# Patient Record
Sex: MALE | Race: WHITE | Employment: OTHER | URBAN - METROPOLITAN AREA
[De-identification: names, ages, dates, MRNs, and addresses within clinical notes are randomized per-mention and may not be internally consistent; named-entity substitution may affect disease eponyms.]

---

## 2017-01-06 ENCOUNTER — TRANSFERRED RECORDS (OUTPATIENT)
Dept: CARDIOLOGY | Facility: CLINIC | Age: 82
End: 2017-01-06

## 2017-03-29 ENCOUNTER — RECORDS - HEALTHEAST (OUTPATIENT)
Dept: LAB | Facility: CLINIC | Age: 82
End: 2017-03-29

## 2017-03-29 LAB
CHOLEST SERPL-MCNC: 128 MG/DL
FASTING STATUS PATIENT QL REPORTED: YES
HDLC SERPL-MCNC: 51 MG/DL
LDLC SERPL CALC-MCNC: 64 MG/DL
TRIGL SERPL-MCNC: 66 MG/DL

## 2017-05-04 ENCOUNTER — HOSPITAL ENCOUNTER (INPATIENT)
Facility: CLINIC | Age: 82
Setting detail: SURGERY ADMIT
End: 2017-05-04
Attending: ORTHOPAEDIC SURGERY | Admitting: ORTHOPAEDIC SURGERY
Payer: MEDICARE

## 2017-05-05 ENCOUNTER — HOSPITAL ENCOUNTER (OUTPATIENT)
Dept: LAB | Facility: CLINIC | Age: 82
Discharge: HOME | End: 2017-05-05
Attending: ORTHOPAEDIC SURGERY | Admitting: ORTHOPAEDIC SURGERY
Payer: MEDICARE

## 2017-05-05 DIAGNOSIS — Z01.812 PRE-OPERATIVE LABORATORY EXAMINATION: ICD-10-CM

## 2017-05-05 LAB
MRSA DNA SPEC QL NAA+PROBE: NORMAL
SPECIMEN SOURCE: NORMAL

## 2017-05-05 PROCEDURE — 87641 MR-STAPH DNA AMP PROBE: CPT | Performed by: ORTHOPAEDIC SURGERY

## 2017-05-05 PROCEDURE — 40000829 ZZHCL STATISTIC STAPH AUREUS SUSCEPT SCREEN PCR: Performed by: ORTHOPAEDIC SURGERY

## 2017-05-05 PROCEDURE — 87640 STAPH A DNA AMP PROBE: CPT | Performed by: ORTHOPAEDIC SURGERY

## 2017-05-05 PROCEDURE — 40000830 ZZHCL STATISTIC STAPH AUREUS METH RESIST SCREEN PCR: Performed by: ORTHOPAEDIC SURGERY

## 2017-05-08 ENCOUNTER — TRANSFERRED RECORDS (OUTPATIENT)
Dept: CARDIOLOGY | Facility: CLINIC | Age: 82
End: 2017-05-08

## 2017-05-10 NOTE — PROGRESS NOTES
Pre-Surgery Review for Right Total Knee Arthroplasty on 5/16/2017:   Patient flagged with the following risk factors:  -Cardiac: History of 1)  multivessel coronary artery disease s/p placement of stents in 2000, 2009 and most recently PTCA to prox LCA, AI R PDA, AI Prox OM1 on 12/8/16. Per cardiac notes from 12/19/16 patient still complaining of chest pain s/p stent placement on 12/8/16. Stress testing completed 1/6/17, no significant changes from 2014 (see results below). On isosorbide 60mg daily for angina, on dual anti-platelet therapy with Aspirin and Plavix s/p stent placement.  2) Aortic Stenosis: reported as mild to moderate per echo 2014- may be future candidate for TAVR. 3) Bradycardia and chronotropic incompetence- question of whether pacemaker versus ablation needed in future (no further notes regarding this available). 4) PVC- reported burden of approx. 11% before Sotalol was discontinued d/t chronotropic incompetence.  5) HTN. Follows with Dr. Paz at [facility], full notes attached to chart. Per Dr. Paz- ok for patient to proceed with knee surgery- he should hold his Plavix 5 days prior to surgery but should continue Aspirin 81 mg through surgery. He should restart Plavix as soon as it is safe after surgery. Ok to proceed to surgery per PCP and Cardiology.  NM Cardiac MPI Stress Test 1/6/17:   Final Conclusions: Images demonstrated a small sized, fixed perfusion abnormality of mild intensity in the basal inferolateral wall consistent with infarction. Normal left ventricular size and systolic function with LVEF of 63%. Normal left ventricular wall motion. Compared to prior study of 2014, there is no significant change in major findings.      Assessment/Plan:  Risk Factors identified above. Will have Anesthesia review notes given cardiac history. Ok to proceed to elective surgery if approved by Anesthesia. Recommend Hospitalist consult to assist with medical management. Per  Cardiology recommendations- Orthopedic surgeon should resume Plavix when safe from a surgical perspective and patient should continue Aspirin 81 mg through surgery due to recent stent placement. Left message at Dr. Seaman s office on 5/10/17 notifying of these cardiology recommendations. Recommend close monitoring of cardiovascular status in juan-op period given history above.

## 2017-05-12 ENCOUNTER — TELEPHONE (OUTPATIENT)
Dept: CARDIOLOGY | Facility: CLINIC | Age: 82
End: 2017-05-12

## 2017-05-12 RX ORDER — CEFAZOLIN SODIUM 1 G/3ML
1 INJECTION, POWDER, FOR SOLUTION INTRAMUSCULAR; INTRAVENOUS SEE ADMIN INSTRUCTIONS
Status: CANCELLED | OUTPATIENT
Start: 2017-05-12

## 2017-05-12 RX ORDER — CEFAZOLIN SODIUM 2 G/100ML
2 INJECTION, SOLUTION INTRAVENOUS
Status: CANCELLED | OUTPATIENT
Start: 2017-05-12

## 2017-05-12 NOTE — TELEPHONE ENCOUNTER
Call made to patient in preparation for visit scheduled with Dr. Vicente 5/12/17 for cardiac clearance for scheduled knee replacement surgery. Patient stated that he is established with a Cardiologist, Dr. Paz, with [facility]. Patient stated that he would prefer to go through the process of obtaining cardiac clearance for surgery through Dr. Paz and his office at this time rather that involve another doctor. He requested to cancel the appointment Dr. Vicente. He stated that he has already contacted Dr. Paz's office to discuss having cardiac clearance completed. He did express interest in transferring care and establishing with a Cardiologist with [facility] so that he could go to his appointments in [place], which would be closer to home for him. Patient provided with telephone number for our scheduling department if he would like to schedule an appointment to do this down the road. He did not wish to set this up at this time. BEV Puentes RN

## 2017-05-14 NOTE — PHARMACY-ADMISSION MEDICATION HISTORY
Admission medication history interview status for this patient is complete. See [facility] admission navigator for allergy information, prior to admission medications and immunization status.     PTA list completed by pre-admitting nurse,   Sara Mata RN u May 4, 2017  3:25 PM       Prior to Admission medications    Medication Sig Last Dose Taking? Auth Provider   ASPIRIN PO Take 81 mg by mouth daily  Yes Reported, Patient   CLOPIDOGREL BISULFATE PO Take 75 mg by mouth daily  Yes Reported, Patient   AMLODIPINE BESYLATE PO Take 10 mg by mouth daily  Yes Reported, Patient   LISINOPRIL PO Take 20 mg by mouth daily  Yes Reported, Patient   ISOSORBIDE DINITRATE PO Take 60 mg by mouth daily   Yes Reported, Patient   PRAVASTATIN SODIUM PO Take 20 mg by mouth every evening   Yes Reported, Patient

## 2017-05-15 ENCOUNTER — DOCUMENTATION ONLY (OUTPATIENT)
Dept: CARDIOLOGY | Facility: CLINIC | Age: 82
End: 2017-05-15

## 2017-05-15 NOTE — PROGRESS NOTES
Records received [facility] Sent to HIM to be scanned Cancelled OV from for Dr. Vicente 5-

## 2017-06-15 ENCOUNTER — HOSPITAL ENCOUNTER (OUTPATIENT)
Dept: LAB | Facility: CLINIC | Age: 82
Discharge: HOME | End: 2017-06-15
Attending: ORTHOPAEDIC SURGERY | Admitting: ORTHOPAEDIC SURGERY
Payer: MEDICARE

## 2017-06-15 DIAGNOSIS — Z01.812 PRE-OPERATIVE LABORATORY EXAMINATION: ICD-10-CM

## 2017-06-15 LAB
MRSA DNA SPEC QL NAA+PROBE: NORMAL
SPECIMEN SOURCE: NORMAL

## 2017-06-15 PROCEDURE — 87641 MR-STAPH DNA AMP PROBE: CPT | Performed by: ORTHOPAEDIC SURGERY

## 2017-06-15 PROCEDURE — 40000830 ZZHCL STATISTIC STAPH AUREUS METH RESIST SCREEN PCR: Performed by: ORTHOPAEDIC SURGERY

## 2017-06-15 PROCEDURE — 87640 STAPH A DNA AMP PROBE: CPT | Performed by: ORTHOPAEDIC SURGERY

## 2017-06-15 PROCEDURE — 40000829 ZZHCL STATISTIC STAPH AUREUS SUSCEPT SCREEN PCR: Performed by: ORTHOPAEDIC SURGERY

## 2017-06-16 NOTE — PLAN OF CARE
Patient and Dr. Seaman's office notified of positive MSSA nasal screen. RX for Mupirocin called to [facility] in [place] [phone]. Mupirocin use and need for extra CHG showers discussed with patient. Questions answered.

## 2017-06-20 NOTE — PROGRESS NOTES
"CT:   D: Pre-surgery contact for a Right TKA scheduled on 6/27/2017. Pt flagging for MRAPT intervention with SNF dispo.  Pt has not had any joint surgeries in the past.    I: Spoke with patient by phone who confirms his preference for rehab at discharge.  Pt lives alone in a one story Rambler with a standard tub and shower.  Spoke with patient about the possibility of a home discharge if he progresses well. Pt states he is not opposed to going home however does not have one of those \"I've fallen and cant get up buttons and I don't plan on getting one until I'm 89\".  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 Pt has a daughter that lives near by however she works full time and cannot take time off.  Daughter would be able to help with cooking and laundry.  Pt states if his insurance allows he would be opened to HC services but still would much rather go to rehab. If his progress is such that he needs a SNF stay [facility] would be his first choice. [facility] would be his second.    A/P: Discharge planning to include consideration for a rehab facility although a home disp is not out of the question. Notified pt that SW and CC would follow for d/c planning as directed by Cathi and MD.   "

## 2017-06-22 NOTE — PROGRESS NOTES
Pre-Surgery Review for Right Total Knee Arthroplasty on 6/27/2017:   Patient flagged with the following risk factors:  -Cardiac: Follows with Dr. Paz at [facility]:   1) Multivessel coronary artery disease s/p placement of stents in 2000, 2009 and most recently with PTCA with AI to proximal circumflex to mid-OM2 and AI R PDA, on 12/8/16. Pt noted to have some improvement in angina after stent placement but continued to have some atypical episodes of chest pain and dyspnea. Stress testing completed on 1/6/17: normal LV function and small fixed defect from old infarction.  There was no ischemia. Results listed below. Repeat stress testing completed 5/24/17 in order to clear from Cardiology standpoint for surgery: Negative for myocardial ischemia, no significant change in major findings compared with prior study of 1/6/17. See full results below. On isosorbide 60mg daily for angina, nitroglycerin prn. On dual anti-platelet therapy with Aspirin and Plavix s/p AI stent placement on 12/8/16. Per Dr. Brandi mark to stop Plavix 5 days prior to surgery but patient should remain on daily 81mg Aspirin through surgery if possible. He should restart Plavix as soon as it is safe after surgery. 6/22/17: Spoke to patient and advised him to stay on daily 81mg Aspirin up until surgery per his Cardiologist recommendations. He has already stopped the Plavix as instructed.    2) Aortic Stenosis: per notes from Dr. Paz 5/17/17: degenerative, moderate stenosis, with a mean gradient of 24 mmHg, monitor with annual echo. No significant risk during surgery. Recommends follow up Echo in Dec 2017. Probable candidate for TAVR when he develops severe aortic stenosis.     3) PVC s- reported burden of approx. 11.5% on Holter monitor 10/2016: failed past treatment with Mexiletine and Sotalol. Reports occasional palpitations but not limiting.     5) HTN. Appears well-controlled on Amlodipine and Lisinopril.   Per Dr. Gwen mark for  patient to proceed with knee surgery. Follow up with him in 6 months or earlier if necessary.    NM Cardiac MPI Stress Test 1/6/17:   Final Conclusions: Images demonstrated a small sized, fixed perfusion abnormality of mild intensity in the basal inferolateral wall consistent with infarction. Normal left ventricular size and systolic function with LVEF of 63%. Normal left ventricular wall motion. Compared to prior study of 2014, there is no significant change in major findings.   Lexiscan Stress Test 5/24/17:   Final Conclusions: The stress test perfusion images show a small area of fixed mildly reduced uptake in mid to basal inferolateral wall. This is secondary to an area of prior myocardial infarction in this region. No significant ischemia was suggested by this study. Normal left ventricular size and systolic function with a calculated LVEF of 69%. Stress ECG is negative for myocardial ischemia. Compared to prior study of 1-6-17, there is no significant change in major findings.      Assessment/Plan:  Risk Factors identified above. Cleared by PCP, Anesthesia and Cardiologist for knee surgery. Recommend Hospitalist consult to assist with medical management. Per Cardiology recommendations- Orthopedic surgeon should resume Plavix when safe from a surgical perspective and patient should continue Aspirin 81mg daily through surgery due to recent stent placement on 12/8/16. Left message at Dr. Seaman s office on 6/22/17 notifying them of these cardiology recommendations. Spoke to patient on 6/22/17 and advised him of his Cardiologist recommendation to remain on daily 81mg Aspirin up until surgery but to stop Plavix 5 days prior to surgery. Would recommend Orthopedic surgeon consider keeping patient on Aspirin and Plavix for DVT Prophylaxis after surgery given patient needs to be on these for his CAD S/P stent placement 12/8/16. Recommend close monitoring of cardiovascular status in juan-op period given history above.

## 2017-06-24 NOTE — PHARMACY-ADMISSION MEDICATION HISTORY
Med rec completed by pre admitting RN    Prior to Admission medications    Medication Sig Last Dose Taking? Auth Provider   mupirocin (BACTROBAN) 2 % nasal ointment Apply into each nare 2 times daily  Yes Reported, Patient   Clopidogrel Bisulfate (PLAVIX PO) Take 75 mg by mouth daily  Yes Reported, Patient   AMLODIPINE BESYLATE PO Take 10 mg by mouth daily  Yes Reported, Patient   ASPIRIN PO Take 81 mg by mouth daily  Yes Reported, Patient   LISINOPRIL PO Take 20 mg by mouth daily  Yes Reported, Patient   ISOSORBIDE DINITRATE PO Take 60 mg by mouth daily   Yes Reported, Patient   PRAVASTATIN SODIUM PO Take 20 mg by mouth every evening   Yes Reported, Patient

## 2017-06-26 ENCOUNTER — HOSPITAL ENCOUNTER (OUTPATIENT)
Dept: LAB | Facility: CLINIC | Age: 82
Discharge: HOME | DRG: 470 | End: 2017-06-26
Attending: ORTHOPAEDIC SURGERY | Admitting: ORTHOPAEDIC SURGERY
Payer: MEDICARE

## 2017-06-26 DIAGNOSIS — Z01.812 PRE-OPERATIVE LABORATORY EXAMINATION: ICD-10-CM

## 2017-06-26 LAB
ABO + RH BLD: NORMAL
ABO + RH BLD: NORMAL
BLD GP AB SCN SERPL QL: NORMAL
BLOOD BANK CMNT PATIENT-IMP: NORMAL
CREAT SERPL-MCNC: 1.36 MG/DL (ref 0.66–1.25)
GFR SERPL CREATININE-BSD FRML MDRD: 50 ML/MIN/1.7M2
POTASSIUM SERPL-SCNC: 4 MMOL/L (ref 3.4–5.3)
SPECIMEN EXP DATE BLD: NORMAL

## 2017-06-27 ENCOUNTER — ANESTHESIA EVENT (OUTPATIENT)
Dept: SURGERY | Facility: CLINIC | Age: 82
DRG: 470 | End: 2017-06-27
Payer: MEDICARE

## 2017-06-27 ENCOUNTER — HOSPITAL ENCOUNTER (INPATIENT)
Facility: CLINIC | Age: 82
LOS: 3 days | Discharge: NURSING, REHAB OR LONG-TERM CARE | DRG: 470 | End: 2017-06-30
Attending: ORTHOPAEDIC SURGERY | Admitting: ORTHOPAEDIC SURGERY
Payer: MEDICARE

## 2017-06-27 ENCOUNTER — APPOINTMENT (OUTPATIENT)
Dept: GENERAL RADIOLOGY | Facility: CLINIC | Age: 82
DRG: 470 | End: 2017-06-27
Attending: ORTHOPAEDIC SURGERY
Payer: MEDICARE

## 2017-06-27 ENCOUNTER — ANESTHESIA (OUTPATIENT)
Dept: SURGERY | Facility: CLINIC | Age: 82
DRG: 470 | End: 2017-06-27
Payer: MEDICARE

## 2017-06-27 DIAGNOSIS — Z96.651 STATUS POST TOTAL RIGHT KNEE REPLACEMENT: Primary | ICD-10-CM

## 2017-06-27 PROBLEM — M17.9 DEGENERATIVE ARTHRITIS OF KNEE: Status: ACTIVE | Noted: 2017-06-27

## 2017-06-27 LAB — INR PPP: 1.06 (ref 0.86–1.14)

## 2017-06-27 PROCEDURE — A9270 NON-COVERED ITEM OR SERVICE: HCPCS | Mod: GY | Performed by: ORTHOPAEDIC SURGERY

## 2017-06-27 PROCEDURE — 37000008 ZZH ANESTHESIA TECHNICAL FEE, 1ST 30 MIN: Performed by: ORTHOPAEDIC SURGERY

## 2017-06-27 PROCEDURE — 25800025 ZZH RX 258: Performed by: ORTHOPAEDIC SURGERY

## 2017-06-27 PROCEDURE — 0SRC0J9 REPLACEMENT OF RIGHT KNEE JOINT WITH SYNTHETIC SUBSTITUTE, CEMENTED, OPEN APPROACH: ICD-10-PCS | Performed by: ORTHOPAEDIC SURGERY

## 2017-06-27 PROCEDURE — 25000128 H RX IP 250 OP 636: Performed by: ORTHOPAEDIC SURGERY

## 2017-06-27 PROCEDURE — 99207 ZZC CONSULT E&M CHANGED TO SUBSEQUENT LEVEL: CPT | Performed by: INTERNAL MEDICINE

## 2017-06-27 PROCEDURE — A9270 NON-COVERED ITEM OR SERVICE: HCPCS | Mod: GY | Performed by: INTERNAL MEDICINE

## 2017-06-27 PROCEDURE — 71000013 ZZH RECOVERY PHASE 1 LEVEL 1 EA ADDTL HR: Performed by: ORTHOPAEDIC SURGERY

## 2017-06-27 PROCEDURE — 71000012 ZZH RECOVERY PHASE 1 LEVEL 1 FIRST HR: Performed by: ORTHOPAEDIC SURGERY

## 2017-06-27 PROCEDURE — 25000128 H RX IP 250 OP 636: Performed by: ANESTHESIOLOGY

## 2017-06-27 PROCEDURE — 27210794 ZZH OR GENERAL SUPPLY STERILE: Performed by: ORTHOPAEDIC SURGERY

## 2017-06-27 PROCEDURE — 25000128 H RX IP 250 OP 636: Performed by: NURSE ANESTHETIST, CERTIFIED REGISTERED

## 2017-06-27 PROCEDURE — 36000063 ZZH SURGERY LEVEL 4 EA 15 ADDTL MIN: Performed by: ORTHOPAEDIC SURGERY

## 2017-06-27 PROCEDURE — 99232 SBSQ HOSP IP/OBS MODERATE 35: CPT | Performed by: INTERNAL MEDICINE

## 2017-06-27 PROCEDURE — C1776 JOINT DEVICE (IMPLANTABLE): HCPCS | Performed by: ORTHOPAEDIC SURGERY

## 2017-06-27 PROCEDURE — 25000132 ZZH RX MED GY IP 250 OP 250 PS 637: Mod: GY | Performed by: INTERNAL MEDICINE

## 2017-06-27 PROCEDURE — 40000986 XR KNEE PORT RT 1/2 VW: Mod: RT

## 2017-06-27 PROCEDURE — 36000093 ZZH SURGERY LEVEL 4 1ST 30 MIN: Performed by: ORTHOPAEDIC SURGERY

## 2017-06-27 PROCEDURE — 25000125 ZZHC RX 250: Performed by: ORTHOPAEDIC SURGERY

## 2017-06-27 PROCEDURE — 37000009 ZZH ANESTHESIA TECHNICAL FEE, EACH ADDTL 15 MIN: Performed by: ORTHOPAEDIC SURGERY

## 2017-06-27 PROCEDURE — 25000125 ZZHC RX 250: Performed by: NURSE ANESTHETIST, CERTIFIED REGISTERED

## 2017-06-27 PROCEDURE — 85610 PROTHROMBIN TIME: CPT | Performed by: ORTHOPAEDIC SURGERY

## 2017-06-27 PROCEDURE — 36415 COLL VENOUS BLD VENIPUNCTURE: CPT | Performed by: ORTHOPAEDIC SURGERY

## 2017-06-27 PROCEDURE — 93010 ELECTROCARDIOGRAM REPORT: CPT | Performed by: INTERNAL MEDICINE

## 2017-06-27 PROCEDURE — 12000007 ZZH R&B INTERMEDIATE

## 2017-06-27 PROCEDURE — 27810169 ZZH OR IMPLANT GENERAL: Performed by: ORTHOPAEDIC SURGERY

## 2017-06-27 PROCEDURE — 40000171 ZZH STATISTIC PRE-PROCEDURE ASSESSMENT III: Performed by: ORTHOPAEDIC SURGERY

## 2017-06-27 PROCEDURE — 25000132 ZZH RX MED GY IP 250 OP 250 PS 637: Mod: GY | Performed by: ORTHOPAEDIC SURGERY

## 2017-06-27 DEVICE — IMP COMP PATELLA STRK TRI SYM X3 36X10MM 5550-G-360: Type: IMPLANTABLE DEVICE | Site: KNEE | Status: FUNCTIONAL

## 2017-06-27 DEVICE — BONE CEMENT SIMPLEX FULL DOSE 6191-1-001: Type: IMPLANTABLE DEVICE | Site: KNEE | Status: FUNCTIONAL

## 2017-06-27 DEVICE — IMP BASEPLATE TIBIAL HOWM TRI 6 5520-B-600: Type: IMPLANTABLE DEVICE | Site: KNEE | Status: FUNCTIONAL

## 2017-06-27 DEVICE — IMP COMP FEM STRK TRIATHLN CR RT 6 5510-F-602: Type: IMPLANTABLE DEVICE | Site: KNEE | Status: FUNCTIONAL

## 2017-06-27 DEVICE — IMPLANTABLE DEVICE: Type: IMPLANTABLE DEVICE | Site: KNEE | Status: FUNCTIONAL

## 2017-06-27 RX ORDER — ONDANSETRON 2 MG/ML
INJECTION INTRAMUSCULAR; INTRAVENOUS PRN
Status: DISCONTINUED | OUTPATIENT
Start: 2017-06-27 | End: 2017-06-27

## 2017-06-27 RX ORDER — DEXAMETHASONE SODIUM PHOSPHATE 10 MG/ML
INJECTION INTRAMUSCULAR; INTRAVENOUS PRN
Status: DISCONTINUED | OUTPATIENT
Start: 2017-06-27 | End: 2017-06-27 | Stop reason: HOSPADM

## 2017-06-27 RX ORDER — LISINOPRIL 20 MG/1
20 TABLET ORAL DAILY
Status: DISCONTINUED | OUTPATIENT
Start: 2017-06-27 | End: 2017-06-30 | Stop reason: HOSPADM

## 2017-06-27 RX ORDER — CLOPIDOGREL BISULFATE 75 MG/1
75 TABLET ORAL DAILY
Status: DISCONTINUED | OUTPATIENT
Start: 2017-06-27 | End: 2017-06-30 | Stop reason: HOSPADM

## 2017-06-27 RX ORDER — LIDOCAINE 40 MG/G
CREAM TOPICAL
Status: DISCONTINUED | OUTPATIENT
Start: 2017-06-27 | End: 2017-06-27 | Stop reason: HOSPADM

## 2017-06-27 RX ORDER — OXYCODONE HYDROCHLORIDE 5 MG/1
5 TABLET ORAL
Status: DISCONTINUED | OUTPATIENT
Start: 2017-06-27 | End: 2017-06-28

## 2017-06-27 RX ORDER — ONDANSETRON 2 MG/ML
4 INJECTION INTRAMUSCULAR; INTRAVENOUS EVERY 30 MIN PRN
Status: DISCONTINUED | OUTPATIENT
Start: 2017-06-27 | End: 2017-06-27 | Stop reason: HOSPADM

## 2017-06-27 RX ORDER — ACETAMINOPHEN 325 MG/1
650 TABLET ORAL EVERY 4 HOURS PRN
Status: DISCONTINUED | OUTPATIENT
Start: 2017-06-30 | End: 2017-06-30 | Stop reason: HOSPADM

## 2017-06-27 RX ORDER — ACETAMINOPHEN 325 MG/1
975 TABLET ORAL EVERY 8 HOURS
Status: COMPLETED | OUTPATIENT
Start: 2017-06-27 | End: 2017-06-30

## 2017-06-27 RX ORDER — PROPOFOL 10 MG/ML
INJECTION, EMULSION INTRAVENOUS CONTINUOUS PRN
Status: DISCONTINUED | OUTPATIENT
Start: 2017-06-27 | End: 2017-06-27

## 2017-06-27 RX ORDER — ISOSORBIDE DINITRATE 20 MG/1
60 TABLET ORAL DAILY
Status: DISCONTINUED | OUTPATIENT
Start: 2017-06-28 | End: 2017-06-30 | Stop reason: HOSPADM

## 2017-06-27 RX ORDER — ONDANSETRON 2 MG/ML
4 INJECTION INTRAMUSCULAR; INTRAVENOUS EVERY 6 HOURS PRN
Status: DISCONTINUED | OUTPATIENT
Start: 2017-06-27 | End: 2017-06-30 | Stop reason: HOSPADM

## 2017-06-27 RX ORDER — LIDOCAINE 40 MG/G
CREAM TOPICAL
Status: DISCONTINUED | OUTPATIENT
Start: 2017-06-27 | End: 2017-06-30 | Stop reason: HOSPADM

## 2017-06-27 RX ORDER — NALOXONE HYDROCHLORIDE 0.4 MG/ML
.1-.4 INJECTION, SOLUTION INTRAMUSCULAR; INTRAVENOUS; SUBCUTANEOUS
Status: DISCONTINUED | OUTPATIENT
Start: 2017-06-27 | End: 2017-06-30 | Stop reason: HOSPADM

## 2017-06-27 RX ORDER — HYDROXYZINE HYDROCHLORIDE 10 MG/1
10 TABLET, FILM COATED ORAL EVERY 6 HOURS PRN
Status: DISCONTINUED | OUTPATIENT
Start: 2017-06-27 | End: 2017-06-30 | Stop reason: HOSPADM

## 2017-06-27 RX ORDER — SODIUM CHLORIDE, SODIUM LACTATE, POTASSIUM CHLORIDE, CALCIUM CHLORIDE 600; 310; 30; 20 MG/100ML; MG/100ML; MG/100ML; MG/100ML
INJECTION, SOLUTION INTRAVENOUS CONTINUOUS
Status: DISCONTINUED | OUTPATIENT
Start: 2017-06-27 | End: 2017-06-27 | Stop reason: HOSPADM

## 2017-06-27 RX ORDER — METOPROLOL TARTRATE 1 MG/ML
INJECTION, SOLUTION INTRAVENOUS PRN
Status: DISCONTINUED | OUTPATIENT
Start: 2017-06-27 | End: 2017-06-27

## 2017-06-27 RX ORDER — NALOXONE HYDROCHLORIDE 0.4 MG/ML
.1-.4 INJECTION, SOLUTION INTRAMUSCULAR; INTRAVENOUS; SUBCUTANEOUS
Status: DISCONTINUED | OUTPATIENT
Start: 2017-06-27 | End: 2017-06-27 | Stop reason: HOSPADM

## 2017-06-27 RX ORDER — PRAVASTATIN SODIUM 20 MG
20 TABLET ORAL EVERY EVENING
Status: DISCONTINUED | OUTPATIENT
Start: 2017-06-27 | End: 2017-06-30 | Stop reason: HOSPADM

## 2017-06-27 RX ORDER — AMOXICILLIN 250 MG
1-2 CAPSULE ORAL 2 TIMES DAILY
Status: DISCONTINUED | OUTPATIENT
Start: 2017-06-27 | End: 2017-06-30 | Stop reason: HOSPADM

## 2017-06-27 RX ORDER — HYDROMORPHONE HCL/0.9% NACL/PF 0.2MG/0.2
0.2 SYRINGE (ML) INTRAVENOUS
Status: DISCONTINUED | OUTPATIENT
Start: 2017-06-27 | End: 2017-06-30 | Stop reason: HOSPADM

## 2017-06-27 RX ORDER — MEPERIDINE HYDROCHLORIDE 25 MG/ML
12.5 INJECTION INTRAMUSCULAR; INTRAVENOUS; SUBCUTANEOUS
Status: DISCONTINUED | OUTPATIENT
Start: 2017-06-27 | End: 2017-06-27 | Stop reason: HOSPADM

## 2017-06-27 RX ORDER — FENTANYL CITRATE 50 UG/ML
25-50 INJECTION, SOLUTION INTRAMUSCULAR; INTRAVENOUS
Status: DISCONTINUED | OUTPATIENT
Start: 2017-06-27 | End: 2017-06-27 | Stop reason: HOSPADM

## 2017-06-27 RX ORDER — ASPIRIN 81 MG/1
81 TABLET, CHEWABLE ORAL DAILY
Status: DISCONTINUED | OUTPATIENT
Start: 2017-06-28 | End: 2017-06-30 | Stop reason: HOSPADM

## 2017-06-27 RX ORDER — ONDANSETRON 4 MG/1
4 TABLET, ORALLY DISINTEGRATING ORAL EVERY 6 HOURS PRN
Status: DISCONTINUED | OUTPATIENT
Start: 2017-06-27 | End: 2017-06-30 | Stop reason: HOSPADM

## 2017-06-27 RX ORDER — ALBUTEROL SULFATE 0.83 MG/ML
2.5 SOLUTION RESPIRATORY (INHALATION) EVERY 4 HOURS PRN
Status: DISCONTINUED | OUTPATIENT
Start: 2017-06-27 | End: 2017-06-27 | Stop reason: HOSPADM

## 2017-06-27 RX ORDER — AMLODIPINE BESYLATE 10 MG/1
10 TABLET ORAL DAILY
Status: DISCONTINUED | OUTPATIENT
Start: 2017-06-28 | End: 2017-06-30 | Stop reason: HOSPADM

## 2017-06-27 RX ORDER — BUPIVACAINE HYDROCHLORIDE AND EPINEPHRINE 5; 5 MG/ML; UG/ML
INJECTION, SOLUTION EPIDURAL; INTRACAUDAL; PERINEURAL PRN
Status: DISCONTINUED | OUTPATIENT
Start: 2017-06-27 | End: 2017-06-27 | Stop reason: HOSPADM

## 2017-06-27 RX ORDER — METOPROLOL TARTRATE 1 MG/ML
1-2 INJECTION, SOLUTION INTRAVENOUS EVERY 5 MIN PRN
Status: DISCONTINUED | OUTPATIENT
Start: 2017-06-27 | End: 2017-06-27 | Stop reason: HOSPADM

## 2017-06-27 RX ORDER — CEFAZOLIN SODIUM 1 G/3ML
1 INJECTION, POWDER, FOR SOLUTION INTRAMUSCULAR; INTRAVENOUS SEE ADMIN INSTRUCTIONS
Status: DISCONTINUED | OUTPATIENT
Start: 2017-06-27 | End: 2017-06-27 | Stop reason: HOSPADM

## 2017-06-27 RX ORDER — DEXTROSE MONOHYDRATE, SODIUM CHLORIDE, AND POTASSIUM CHLORIDE 50; 1.49; 4.5 G/1000ML; G/1000ML; G/1000ML
INJECTION, SOLUTION INTRAVENOUS CONTINUOUS
Status: DISCONTINUED | OUTPATIENT
Start: 2017-06-27 | End: 2017-06-29

## 2017-06-27 RX ORDER — HYDROMORPHONE HYDROCHLORIDE 1 MG/ML
.3-.5 INJECTION, SOLUTION INTRAMUSCULAR; INTRAVENOUS; SUBCUTANEOUS EVERY 10 MIN PRN
Status: DISCONTINUED | OUTPATIENT
Start: 2017-06-27 | End: 2017-06-27 | Stop reason: HOSPADM

## 2017-06-27 RX ORDER — PROMETHAZINE HYDROCHLORIDE 25 MG/ML
6.25 INJECTION, SOLUTION INTRAMUSCULAR; INTRAVENOUS
Status: DISCONTINUED | OUTPATIENT
Start: 2017-06-27 | End: 2017-06-27 | Stop reason: HOSPADM

## 2017-06-27 RX ORDER — ONDANSETRON 4 MG/1
4 TABLET, ORALLY DISINTEGRATING ORAL EVERY 30 MIN PRN
Status: DISCONTINUED | OUTPATIENT
Start: 2017-06-27 | End: 2017-06-27 | Stop reason: HOSPADM

## 2017-06-27 RX ORDER — PROCHLORPERAZINE MALEATE 5 MG
5 TABLET ORAL EVERY 6 HOURS PRN
Status: DISCONTINUED | OUTPATIENT
Start: 2017-06-27 | End: 2017-06-30 | Stop reason: HOSPADM

## 2017-06-27 RX ORDER — CEFAZOLIN SODIUM 2 G/100ML
2 INJECTION, SOLUTION INTRAVENOUS
Status: COMPLETED | OUTPATIENT
Start: 2017-06-27 | End: 2017-06-27

## 2017-06-27 RX ADMIN — CLOPIDOGREL 75 MG: 75 TABLET, FILM COATED ORAL at 18:22

## 2017-06-27 RX ADMIN — PHENYLEPHRINE HYDROCHLORIDE 100 MCG: 10 INJECTION, SOLUTION INTRAMUSCULAR; INTRAVENOUS; SUBCUTANEOUS at 12:33

## 2017-06-27 RX ADMIN — ONDANSETRON 4 MG: 2 INJECTION INTRAMUSCULAR; INTRAVENOUS at 12:55

## 2017-06-27 RX ADMIN — CEFAZOLIN SODIUM 2 G: 2 INJECTION, SOLUTION INTRAVENOUS at 11:57

## 2017-06-27 RX ADMIN — Medication 1 G: at 13:15

## 2017-06-27 RX ADMIN — SODIUM CHLORIDE, POTASSIUM CHLORIDE, SODIUM LACTATE AND CALCIUM CHLORIDE: 600; 310; 30; 20 INJECTION, SOLUTION INTRAVENOUS at 13:00

## 2017-06-27 RX ADMIN — LISINOPRIL 20 MG: 20 TABLET ORAL at 18:23

## 2017-06-27 RX ADMIN — POTASSIUM CHLORIDE, DEXTROSE MONOHYDRATE AND SODIUM CHLORIDE: 150; 5; 450 INJECTION, SOLUTION INTRAVENOUS at 17:02

## 2017-06-27 RX ADMIN — PRAVASTATIN SODIUM 20 MG: 20 TABLET ORAL at 18:23

## 2017-06-27 RX ADMIN — GENTAMICIN SULFATE: 40 INJECTION, SOLUTION INTRAMUSCULAR; INTRAVENOUS at 12:30

## 2017-06-27 RX ADMIN — CEFAZOLIN SODIUM 1 G: 1 INJECTION, SOLUTION INTRAVENOUS at 20:24

## 2017-06-27 RX ADMIN — Medication 1 G: at 12:12

## 2017-06-27 RX ADMIN — SENNOSIDES AND DOCUSATE SODIUM 1 TABLET: 8.6; 5 TABLET ORAL at 20:24

## 2017-06-27 RX ADMIN — OXYCODONE HYDROCHLORIDE 5 MG: 5 TABLET ORAL at 16:31

## 2017-06-27 RX ADMIN — PROPOFOL 75 MCG/KG/MIN: 10 INJECTION, EMULSION INTRAVENOUS at 12:08

## 2017-06-27 RX ADMIN — HYDROXYZINE HYDROCHLORIDE 10 MG: 10 TABLET ORAL at 20:24

## 2017-06-27 RX ADMIN — SODIUM CHLORIDE, POTASSIUM CHLORIDE, SODIUM LACTATE AND CALCIUM CHLORIDE: 600; 310; 30; 20 INJECTION, SOLUTION INTRAVENOUS at 11:57

## 2017-06-27 RX ADMIN — METOPROLOL TARTRATE 1 MG: 5 INJECTION INTRAVENOUS at 12:05

## 2017-06-27 RX ADMIN — OXYCODONE HYDROCHLORIDE 5 MG: 5 TABLET ORAL at 19:30

## 2017-06-27 RX ADMIN — ACETAMINOPHEN 975 MG: 325 TABLET, FILM COATED ORAL at 16:31

## 2017-06-27 NOTE — ANESTHESIA CARE TRANSFER NOTE
Patient: Mickey Donald    Procedure(s):  Right total knee arthroplasty - Wound Class: I-Clean    Diagnosis: Right DJD  Diagnosis Additional Information: No value filed.    Anesthesia Type:   No value filed.     Note:    Patient transferred to:PACU  Comments: Pt tolerated spinal well to PACU VSS Report to RN      Vitals: (Last set prior to Anesthesia Care Transfer)    CRNA VITALS  6/27/2017 1251 - 6/27/2017 1326      6/27/2017             Pulse: 86    SpO2: 100 %                Electronically Signed By: JAVI Baron CRNA  June 27, 2017  1:26 PM

## 2017-06-27 NOTE — ANESTHESIA PROCEDURE NOTES
Peripheral nerve/Neuraxial procedure note : Femoral  Pre-Procedure  Performed by MIKY BACA  Referred by MEL SAWANT MD  Location: pre-op    Procedure Times:6/27/2017 11:34 AM and 6/27/2017 11:39 AM  Pre-Anesthestic Checklist: patient identified, IV checked, site marked, risks and benefits discussed, informed consent, monitors and equipment checked, pre-op evaluation, at physician/surgeon's request and post-op pain management    Timeout  Correct Patient: Yes   Correct Procedure: Yes   Correct Site: Yes   Correct Laterality: Yes   Correct Position: Yes   Site Marked: Yes   .   Procedure Documentation    .    Procedure:    Femoral.  Local skin infiltrated with mL of 1% lidocaine.     Ultrasound used to identify targeted nerve, plexus, or vascular marker and placed a needle adjacent to it., Ultrasound was used to visualize the spread of the anesthetic in close proximity to the above stated nerve.   Patient Prep;mask, sterile gloves, povidone-iodine 7.5% surgical scrub.  Nerve Stim: Initial Level 1 mA. Lowest motor response mA..  Needle: insulated  Needle Length (Inches) 2  Insertion Method: Single Shot.       Assessment/Narrative  Paresthesias: No.  .  The placement was negative for: blood aspirated, painful injection and site bleeding.  Bolus given via needle..   Secured via.   Complications: none. Comments:  ./The surgeon has given a verbal order transferring care of this patient to me for the performance of a regional analgesia block for post-op pain control. It is requested of me because I am uniquely trained and qualified to perform this block and the surgeon is neither trained nor qualified to perform this procedure.  .Femoral Nerve Block    Medication- Bupivicaine 0.75% 16cc + Lido 2% w/ epi 1:200k 4cc    JUAN Baca

## 2017-06-27 NOTE — CONSULTS
"[facility]  Hospitalist Consult Note    Sukhwinder Pizarro MD 06/27/2017  Text Page      Reason for Stay (Diagnosis): TKA         Assessment and Plan:      Summary of Stay: Mickey Donald is a 85 year old male admitted on 6/27/2017 after R TKA.  Hx of CAD with 3 stents, moderate AS, PVCs, HTN, dyslipidemia, inguinal hernia repair, closed head injury.     Problem List:   1. Post op R TKA-  doing well.  Pain well controlled.  Continue with PT and pain control.  2. HTN- resume lisinopril, amlodipine, imdur with parameters.   3. CAD- currently asymptomatic.  Resume ASA and plavix if OK with ortho.  4. AS- no current symptoms.  5. Dyslipidemia- resume pravastatin      DVT Prophylaxis: Warfarin  Code Status: Full Code        Interval History (Subjective):      Mild pain in R knee, otherwise feels well.                    Physical Exam:      Last Vital Signs:  /89  Pulse 94  Temp 95.2  F (35.1  C) (Oral)  Resp 16  Ht 1.753 m (5' 9\")  Wt 76.2 kg (168 lb)  SpO2 96%  BMI 24.81 kg/m2    Vital signs reviewed  General:  Alert, calm, NAD  CV: regular rate and rhythm, 3/6 LAWSON  Lungs:  Clear to ascultation bilaterally, normal respiratory effort  HEENT:  Pupil round, equal, conjuctivae, sclerae and lids normal, neck is supple  Abdomen:  Soft, nontender, nondistended, no masses, normal bowel sounds  Extremities:  No edema.  R leg wrapped.   Neuro: normal strength and sensation in all 4 extremities, cranial nerves grossly intact  Psychiatric:  Mood and affect within normal limits           Medications:      All current medications were reviewed with changes reflected in problem list.         Data:      All new lab and imaging data was reviewed.   Labs:  Cr 1.3    "

## 2017-06-27 NOTE — ANESTHESIA PREPROCEDURE EVALUATION
Anesthesia Evaluation     .             ROS/MED HX    ENT/Pulmonary:  - neg pulmonary ROS     Neurologic:  - neg neurologic ROS     Cardiovascular: Comment: Aortic stenosis  1.1 cm valve area  Gradient 28mmHg    (+) hypertension--CAD, --stent,. : . . . :. valvular problems/murmurs type: AS .       METS/Exercise Tolerance:     Hematologic:  - neg hematologic  ROS       Musculoskeletal:  - neg musculoskeletal ROS       GI/Hepatic:  - neg GI/hepatic ROS       Renal/Genitourinary:  - ROS Renal section negative       Endo: Comment: .Body mass index is 24.81 kg/(m^2).   - neg endo ROS       Psychiatric:  - neg psychiatric ROS       Infectious Disease:  - neg infectious disease ROS       Malignancy:         Other: Comment: .No lab results found.   Lab Test        06/26/17                       0715          POTASSIUM    4.0           CR           1.36*            - neg other ROS                 Physical Exam  Normal systems: cardiovascular and pulmonary    Airway   Mallampati: II    Dental     Cardiovascular   Rhythm and rate: regular and normal      Pulmonary    breath sounds clear to auscultation                    Anesthesia Plan      History & Physical Review  History and physical reviewed and following examination; no interval change.    ASA Status:  3 .        Plan for Spinal and Periph. Nerve Block for postop pain          Postoperative Care  Postoperative pain management:  IV analgesics, Oral pain medications and Multi-modal analgesia.      Consents                          .

## 2017-06-27 NOTE — IP AVS SNAPSHOT
` [facility]: [phone]            Medication Administration Report for Mickey Donald as of 06/30/17 1342   Legend:    Given Hold Not Given Due Canceled Entry Other Actions    Time Time (Time) Time  Time-Action       Inactive    Active    Linked        Medications 06/24/17 06/25/17 06/26/17 06/27/17 06/28/17 06/29/17 06/30/17    acetaminophen (TYLENOL) tablet 650 mg  Dose: 650 mg Freq: EVERY 4 HOURS PRN Route: PO  PRN Reason: other  PRN Comment: surgical pain  Start: 06/30/17 0000   Admin Instructions: May give first dose 4 hours after last scheduled dose of acetaminophen.  Maximum acetaminophen dose from all sources = 75 mg/kg/day not to exceed 4 grams/day.               amLODIPine (NORVASC) tablet 10 mg  Dose: 10 mg Freq: DAILY Route: PO  Start: 06/28/17 0800   Admin Instructions: Hold if SBP < 110         0820 (10 mg)-Given        0806 (10 mg)-Given        0820 (10 mg)-Given           aspirin chewable tablet 81 mg  Dose: 81 mg Freq: DAILY Route: PO  Start: 06/28/17 0800        0820 (81 mg)-Given        0807 (81 mg)-Given        0820 (81 mg)-Given           benzocaine-menthol (CHLORASEPTIC) 6-10 MG lozenge 1-2 lozenge  Dose: 1-2 lozenge Freq: EVERY 1 HOUR PRN Route: BU  PRN Reason: sore throat  PRN Comment: sore throat without fever  Start: 06/27/17 1531              clopidogrel (PLAVIX) tablet 75 mg  Dose: 75 mg Freq: DAILY Route: PO  Start: 06/27/17 1630   Admin Instructions: Resume if ok with ortho        1822 (75 mg)-Given        0820 (75 mg)-Given        0807 (75 mg)-Given        0820 (75 mg)-Given           HYDROmorphone (DILAUDID) injection 0.2 mg  Dose: 0.2 mg Freq: EVERY 2 HOURS PRN Route: IV  PRN Reason: severe pain  PRN Comment: or if patient unable to take PO  Start: 06/27/17 1531   Admin Instructions: Hold while on PCA.               hydrOXYzine (ATARAX) tablet 10 mg  Dose: 10 mg Freq: EVERY 6 HOURS PRN Route: PO  PRN Reason: itching  Start: 06/27/17 1531   Admin  "Instructions: Caution to be used when administering multiple CNS depressing meds within a short time frame.        2024 (10 mg)-Given              isosorbide dinitrate (ISORDIL) tablet 60 mg  Dose: 60 mg Freq: DAILY Route: PO  Start: 06/28/17 0800   Admin Instructions: Hold if SBP <110         0820 (60 mg)-Given        0806 (60 mg)-Given        0820 (60 mg)-Given           lidocaine (LMX4) kit  Freq: EVERY 1 HOUR PRN Route: Top  PRN Reason: pain  PRN Comment: with VAD insertion or accessing implanted port.  Start: 06/27/17 1531   Admin Instructions: Do NOT give if patient has a history of allergy to any local anesthetic or any \"asia\" product.   Apply 30 minutes prior to VAD insertion or port access.  MAX Dose:  2.5 g (  of 5 g tube)               lidocaine 1 % 1 mL  Dose: 1 mL Freq: EVERY 1 HOUR PRN Route: OTHER  PRN Comment: mild pain with VAD insertion or accessing implanted port  Start: 06/27/17 1531   Admin Instructions: Do NOT give if patient has a history of allergy to any local anesthetic or any \"asia\" product. MAX dose 1 mL subcutaneous OR intradermal in divided doses.               lisinopril (PRINIVIL/ZESTRIL) tablet 20 mg  Dose: 20 mg Freq: DAILY Route: PO  Start: 06/27/17 1800       1823 (20 mg)-Given        (0823)-Not Given [C]       2007 (20 mg)-Given        2018 (20 mg)-Given        [ ] 2000           naloxone (NARCAN) injection 0.1-0.4 mg  Dose: 0.1-0.4 mg Freq: EVERY 2 MIN PRN Route: IV  PRN Reason: opioid reversal  Start: 06/27/17 1531   Admin Instructions: For respiratory rate LESS than or EQUAL to 8.  Partial reversal dose:  0.1 mg titrated q 2 minutes for Analgesia Side Effects Monitoring Sedation Level of 3 (frequently drowsy, arousable, drifts to sleep during conversation).Full reversal dose:  0.4 mg bolus for Analgesia Side Effects Monitoring Sedation Level of 4 (somnolent, minimal or no response to stimulation).               ondansetron (ZOFRAN-ODT) ODT tab 4 mg  Dose: 4 mg Freq: EVERY 6 " HOURS PRN Route: PO  PRN Reasons: nausea,vomiting  Start: 06/27/17 1531   Admin Instructions: This is Step 1 of nausea and vomiting management.  If nausea not resolved in 15 minutes, go to Step 2 prochlorperazine (COMPAZINE). Do not push through foil backing. Peel back foil and gently remove. Place on tongue immediately. Administration with liquid unnecessary              Or  ondansetron (ZOFRAN) injection 4 mg  Dose: 4 mg Freq: EVERY 6 HOURS PRN Route: IV  PRN Reasons: nausea,vomiting  Start: 06/27/17 1531   Admin Instructions: This is Step 1 of nausea and vomiting management.  If nausea not resolved in 15 minutes, go to Step 2 prochlorperazine (COMPAZINE).  Irritant.               oxyCODONE (ROXICODONE) IR tablet 5-10 mg  Dose: 5-10 mg Freq: EVERY 3 HOURS PRN Route: PO  PRN Reason: moderate to severe pain  Start: 06/28/17 1421        1517 (10 mg)-Given       2012 (5 mg)-Given       2305 (5 mg)-Given        0807 (5 mg)-Given       1241 (5 mg)-Given       1848 (5 mg)-Given            pravastatin (PRAVACHOL) tablet 20 mg  Dose: 20 mg Freq: EVERY EVENING Route: PO  Start: 06/27/17 2000       1823 (20 mg)-Given               2007 (20 mg)-Given        2018 (20 mg)-Given        [ ] 2000           prochlorperazine (COMPAZINE) injection 5 mg  Dose: 5 mg Freq: EVERY 6 HOURS PRN Route: IV  PRN Reasons: nausea,vomiting  Start: 06/27/17 1531   Admin Instructions: This is Step 2 of nausea and vomiting management.   If nausea not resolved in 15 minutes, give metoclopramide (REGLAN) if ordered (step 3 of nausea and vomiting management)              Or  prochlorperazine (COMPAZINE) tablet 5 mg  Dose: 5 mg Freq: EVERY 6 HOURS PRN Route: PO  PRN Reasons: nausea,vomiting  Start: 06/27/17 1531   Admin Instructions: This is Step 2 of nausea and vomiting management.   If nausea not resolved in 15 minutes, give metoclopramide (REGLAN) if ordered (step 3 of nausea and vomiting management)               senna-docusate  (SENOKOT-S;PERICOLACE) 8.6-50 MG per tablet 1-2 tablet  Dose: 1-2 tablet Freq: 2 TIMES DAILY Route: PO  Start: 06/27/17 2000   Admin Instructions: Start with 1 tablet PO BID, If no bowel movement in 24 hours, increase to 2 tablets PO BID.  Hold for loose stools.        2024 (1 tablet)-Given        0820 (1 tablet)-Given       2007 (1 tablet)-Given        0807 (1 tablet)-Given       2018 (2 tablet)-Given        0820 (2 tablet)-Given       [ ] 2000           sodium chloride (PF) 0.9% PF flush 3 mL  Dose: 3 mL Freq: EVERY 8 HOURS Route: IK  Start: 06/27/17 1545   Admin Instructions: And Q1H PRN, to lock peripheral IV dormant line.        (1602)-Not Given       (2326)-Not Given        1600 (3 mL)-Given        0124 (3 mL)-Given       (1139)-Not Given       1548 (3 mL)-Given        [ ] 0000       (1004)-Not Given       [ ] 1600           sodium chloride (PF) 0.9% PF flush 3 mL  Dose: 3 mL Freq: EVERY 1 HOUR PRN Route: IK  PRN Reason: line flush  PRN Comment: for peripheral IV flush post IV meds  Start: 06/27/17 1531              zolpidem (AMBIEN) half-tab 2.5 mg  Dose: 2.5 mg Freq: AT BEDTIME PRN Route: PO  PRN Reason: sleep  Start: 06/28/17 2000   Admin Instructions: POD 1.  Do not give unless at least 6 hours of uninterrupted sleep is expected.              Completed Medications  Medications 06/24/17 06/25/17 06/26/17 06/27/17 06/28/17 06/29/17 06/30/17         Dose: 975 mg Freq: EVERY 8 HOURS Route: PO  Start: 06/27/17 1600   End: 06/30/17 0819   Admin Instructions: Do not use if patient has an active opioid/acetaminophen analgesic order for pain  Maximum acetaminophen dose from all sources = 75 mg/kg/day not to exceed 4 grams/day.        1631 (975 mg)-Given        0025 (975 mg)-Given       0820 (975 mg)-Given       1559 (975 mg)-Given        0115 (975 mg)-Given       0807 (975 mg)-Given       1548 (975 mg)-Given        0059 (975 mg)-Given       0819 (975 mg)-Given             Dose: 1 g Freq: EVERY 8 HOURS Route:  IV  Indications of Use: SURGICAL PROPHYLAXIS  Start: 06/27/17 2000   End: 06/28/17 0406   Admin Instructions: First post-op dose due 8 hours after intra-op dose, see eMAR.        2024 (1 g)-New Bag        0336 (1 g)-New Bag            Discontinued Medications  Medications 06/24/17 06/25/17 06/26/17 06/27/17 06/28/17 06/29/17 06/30/17         Dose: 2.5 mg Freq: EVERY 4 HOURS PRN Route: NEBULIZATION  PRN Reason: shortness of breath / dyspnea  Start: 06/27/17 1336   End: 06/27/17 1457       1457-Med Discontinued            Freq: PRN  Start: 06/27/17 1252   End: 06/27/17 1457       1252 (30 mL)-Given       1457-Med Discontinued            Freq: PRN  Start: 06/27/17 1252   End: 06/27/17 1457       1252 (40 mg)-Given       1457-Med Discontinued            Rate: 75 mL/hr Freq: CONTINUOUS Route: IV  Start: 06/27/17 1545   End: 06/29/17 1058   Admin Instructions: Change to saline lock when PO well tolerated.        1702 ( )-New Bag        0346 ( )-New Bag        1058-Med Discontinued          Dose: 25-50 mcg Freq: EVERY 15 MIN PRN Route: IV  PRN Reason: other  PRN Comment: acute pain while in Phase II  Start: 06/27/17 1423   End: 06/27/17 1457   Admin Instructions: MAX cumulative dose = 250 mcg.    Use Fentanyl initially, as a short acting agent for acute pain control.  If insufficient, or a longer acting agent is needed, begin Morphine or Hydromorphone if ordered.        1457-Med Discontinued            Dose: 25-50 mcg Freq: EVERY 2 MIN PRN Route: IV  PRN Reason: other  PRN Comment: acute pain while in PACU.  Start: 06/27/17 1336   End: 06/27/17 1457   Admin Instructions: MAX cumulative dose = 250 mcg.    Use Fentanyl initially, as a short acting agent for acute pain control.  If insufficient, or a longer acting agent is needed, begin Morphine or Hydromorphone if ordered.        1457-Med Discontinued            Dose: 0.3-0.5 mg Freq: EVERY 10 MIN PRN Route: IV  PRN Reason: other  PRN Comment: acute pain.  May administer if  Respiratory Rate is greater than 10  Start: 06/27/17 1336   End: 06/27/17 1457   Admin Instructions: If fentanyl is also ordered, use HYDROmorphone if pain control insufficient with fentanyl or a longer acting agent is needed.   Max cumulative dose = 2 mg        1457-Med Discontinued            Rate: 100 mL/hr Freq: CONTINUOUS Route: IV  Start: 06/27/17 1345   End: 06/27/17 1457   Admin Instructions: Continue until IV catheter is weaned               1457-Med Discontinued            Dose: 12.5 mg Freq: EVERY 15 MIN PRN Route: IV  PRN Reason: post anesthesia shivering  Start: 06/27/17 1336   End: 06/27/17 1457       1457-Med Discontinued            Dose: 1-2 mg Freq: EVERY 5 MIN PRN Route: IV  PRN Reason: high blood pressure  PRN Comment: for Systolic Blood Pressure greater than 160 mmHg and Heart Rate greater than 60 bpm.    Start: 06/27/17 1336   End: 06/27/17 1457   Admin Instructions: Max cumulative dose = 10 mg.  For PACU USE ONLY.        1457-Med Discontinued            Dose: 0.1-0.4 mg Freq: EVERY 2 MIN PRN Route: IV  PRN Reason: opioid reversal  Start: 06/27/17 1336   End: 06/27/17 1457   Admin Instructions: For apnea or imminent respiratory arrest: give 0.4 mg IV undiluted Q 2 minutes PRN until desired degree of reversal is obtained, stop opioid and notify provider. Continue monitoring until discharge are criteria met for a minimum of 2 hours.  For severe sedation, decrease in respiratory depth, quality or Respiratory Rate greater than 8: give 0.1 mg IV Q 2 minutes x 3 doses, stop opioid and notify provider.  Try to minimize reversal of analgesia especially in end-of-life patients.  Continue monitoring until discharge criteria are met for a minimum of 2 hours.        1457-Med Discontinued            Dose: 4 mg Freq: EVERY 30 MIN PRN Route: PO  PRN Reasons: nausea,vomiting  Start: 06/27/17 1336   End: 06/27/17 1457   Admin Instructions: MAX total dose = 8 mg, including OR dosing. This is step 1 of the nausea  and vomiting protocol.  If not resolved in 15 minutes, then go to step 2 (Prochlorperazine if ordered).        1457-Med Discontinued         Or    Dose: 4 mg Freq: EVERY 30 MIN PRN Route: IV  PRN Reasons: nausea,vomiting  Start: 06/27/17 1336   End: 06/27/17 1457   Admin Instructions: MAX total dose = 8 mg, including OR dosing. This is step 1 of the nausea and vomiting protocol.  If not resolved in 15 minutes, then go to step 2 (Prochlorperazine if ordered).  Irritant.        1457-Med Discontinued            Freq: CONTINUOUS PRN Route: XX  Start: 06/27/17 1336   End: 06/27/17 1457   Admin Instructions: May administer oral pain medications as ordered by surgeon for take home use.  Discontinue IV pain medication prior to administration of oral pain medication.        1457-Med Discontinued            Dose: 5 mg Freq: EVERY 3 HOURS PRN Route: PO  PRN Reason: moderate to severe pain  Start: 06/27/17 1531   End: 06/28/17 1441   Admin Instructions: IF CrCl is UNKNOWN start at lowest end of dosing range.  Hold while on PCA or with regular IV opioid dosing.        1631 (5 mg)-Given       1930 (5 mg)-Given        0025 (5 mg)-Given       0340 (5 mg)-Given       1233 (5 mg)-Given       1441-Med Discontinued           Dose: 5 mg Freq: EVERY 6 HOURS PRN Route: IV  PRN Reasons: nausea,vomiting  Start: 06/27/17 1336   End: 06/27/17 1457   Admin Instructions: This is Step 2 of the nausea and vomiting protocol.   If nausea not resolved in 15 minutes, give Metoclopramide if ordered (step 3 of nausea and vomiting protocol)          1457-Med Discontinued            Dose: 6.25 mg Freq: ONCE PRN Route: IV  PRN Reason: nausea  Start: 06/27/17 1336   End: 06/27/17 1457   Admin Instructions: Vesicant. Dilute 25 mg with 10 mL of normal saline in a syringe. Resulting concentration = 2.5 mg/ mL. Administer over 3-5 minutes. High risk of tissue necrosis with extravasation.        1457-Med Discontinued            Freq: CONTINUOUS PRN Route:  XX  Start: 06/27/17 1531   End: 06/27/17 1642   Admin Instructions: Reorder warfarin (COUMADIN) daily  Patient is on Warfarin Therapy - check for daily order        1642-Med Discontinued       Medications 06/24/17 06/25/17 06/26/17 06/27/17 06/28/17 06/29/17 06/30/17

## 2017-06-27 NOTE — ANESTHESIA PROCEDURE NOTES
Peripheral nerve/Neuraxial procedure note : intrathecal  Pre-Procedure  Performed by MIKY BACA  Location: pre-op    Procedure Times:6/27/2017 11:41 AM and 6/27/2017 11:46 AM  Pre-Anesthestic Checklist: patient identified, IV checked, risks and benefits discussed, informed consent, monitors and equipment checked, pre-op evaluation and at physician/surgeon's request    Timeout  Correct Patient: Yes   Correct Procedure: Yes   Correct Site: Yes   Correct Laterality: N/A   Correct Position: Yes   Site Marked: N/A   .   Procedure Documentation    .    Procedure:    Intrathecal.  Insertion Site:L3-4  (midline approach)      Patient Prep;mask, sterile gloves, povidone-iodine 7.5% surgical scrub.  .  Needle: Sprotte Spinal Needle (gauge): 24  Spinal/LP Needle Length (inches): 3.5 # of attempts: 1 and # of redirects:  Introducer used .       Assessment/Narrative  Paresthesias: No.  .  .  clear CSF fluid removed . Comments:  Bupivicaine 15mg    R Baca

## 2017-06-27 NOTE — IP AVS SNAPSHOT
[facility]    [address]    [facility] [address]    Phone:  [phone]    Fax:  [phone]                                       After Visit Summary   6/27/2017    Mickey Donald    MRN: [id number]           After Visit Summary Signature Page     I have received my discharge instructions, and my questions have been answered. I have discussed any challenges I see with this plan with the nurse or doctor.    ..........................................................................................................................................  Patient/Patient Representative Signature      ..........................................................................................................................................  Patient Representative Print Name and Relationship to Patient    ..................................................               ................................................  Date                                            Time    ..........................................................................................................................................  Reviewed by Signature/Title    ...................................................              ..............................................  Date                                                            Time

## 2017-06-27 NOTE — BRIEF OP NOTE
[facility] Brief Operative Note    Pre-operative diagnosis: Right DJD   Post-operative diagnosis * No post-op diagnosis entered *  same   Procedure: Procedure(s):  Right total knee arthroplasty - Wound Class: I-Clean   Surgeon(s): Surgeon(s) and Role:     * Saqib Seaman MD - Primary     * Andrea Christensen PA-C - Assisting   Estimated blood loss: * No values recorded between 6/27/2017 12:20 PM and 6/27/2017 12:53 PM *    Specimens: * No specimens in log *   Findings: As above

## 2017-06-27 NOTE — IP AVS SNAPSHOT
"    [facility]: [phone]                                              INTERAGENCY TRANSFER FORM - PHYSICIAN ORDERS   2017                    Hospital Admission Date: 2017  MARIBEL ARACELI SENA   : 1932  Sex: Male        Attending Provider: Saqib Seaman MD     Allergies:  No Known Allergies    Infection:  None   Service:  SURGERY    Ht:  1.753 m (5' 9\")   Wt:  76.2 kg (168 lb)   Admission Wt:  76.7 kg (169 lb)    BMI:  24.81 kg/m 2   BSA:  1.93 m 2            Patient PCP Information     Provider PCP Type    Young Henley MD General      ED Clinical Impression     Diagnosis Description Comment Added By Time Added    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         Medication Review      START taking        Dose / Directions Comments    aspirin 81 MG EC tablet   Used for:  Status post total right knee replacement        Dose:  81 mg   Take 1 tablet (81 mg) by mouth 2 times daily   Quantity:  84 tablet   Refills:  1        COMPRESSION STOCKINGS   Used for:  Status post total right knee replacement        Dose:  1 each   1 each continuous   Quantity:  1 each   Refills:  0        docusate sodium 100 MG tablet   Commonly known as:  COLACE   Used for:  Status post total right knee replacement        Dose:  100 mg   Take 100 mg by mouth 2 times daily for 7 days   Quantity:  60 tablet   Refills:  1        " hydrOXYzine 25 MG tablet   Commonly known as:  ATARAX   Used for:  Status post total right knee replacement        Dose:  25 mg   Take 1 tablet (25 mg) by mouth every 6 hours as needed for itching   Quantity:  60 tablet   Refills:  1        oxyCODONE 5 MG capsule   Commonly known as:  OXY-IR   Used for:  Status post total right knee replacement        2 tabs every 4 hours as needed for pain.   Quantity:  60 capsule   Refills:  0        polyethylene glycol powder   Commonly known as:  MIRALAX   Used for:  Status post total right knee replacement        Dose:  1 capful   Take 17 g (1 capful) by mouth 2 times daily as needed for constipation   Quantity:  510 g   Refills:  1        senna-docusate 8.6-50 MG per tablet   Commonly known as:  SENOKOT-S;PERICOLACE   Used for:  Status post total right knee replacement        Dose:  1 tablet   Take 1 tablet by mouth 2 times daily for 7 days   Quantity:  100 tablet   Refills:  0          CONTINUE these medications which have NOT CHANGED        Dose / Directions Comments    AMLODIPINE BESYLATE PO        Dose:  10 mg   Take 10 mg by mouth daily   Refills:  0        ASPIRIN PO        Dose:  81 mg   Take 81 mg by mouth daily   Refills:  0        ISOSORBIDE DINITRATE PO        Dose:  60 mg   Take 60 mg by mouth daily   Refills:  0        LISINOPRIL PO        Dose:  20 mg   Take 20 mg by mouth daily   Refills:  0        PLAVIX PO        Dose:  75 mg   Take 75 mg by mouth daily   Refills:  0        PRAVASTATIN SODIUM PO        Dose:  20 mg   Take 20 mg by mouth every evening   Refills:  0          STOP taking     mupirocin 2 % nasal ointment   Commonly known as:  BACTROBAN                   Summary of Visit     Reason for your hospital stay       tka             After Care     Activity - Up ad christiano       With caution/walker       Advance Diet as Tolerated       Follow this diet upon discharge: Regular       Continuous Passive Motion Machine       Start CPM Day of Surgery.  0 - 90  degrees. Advance as tolerated.       General info for SNF       Length of Stay Estimate: Short Term Care: Estimated # of Days <30  Condition at Discharge: Improving  Level of care:skilled   Rehabilitation Potential: Excellent  Admission H&P remains valid and up-to-date: Yes  Recent Chemotherapy: N/A  Use Nursing Home Standing Orders: N/A       Mantoux instructions       Give two-step Mantoux (PPD) Per Facility Policy Yes       Wound care       Leave aquacel in place until post-op apt.             Referrals     Occupational Therapy Adult Consult       Evaluate and treat as clinically indicated.    Reason:  S/p tka       Physical Therapy Adult Consult       Evaluate and treat as clinically indicated.    Reason:  S/p tka             Follow-Up Appointment Instructions     Future Labs/Procedures    Follow Up and recommended labs and tests     Comments:    Follow up with Dr. whiting in 12-14 days.  No follow up labs or test are needed.      Follow-Up Appointment Instructions     Follow Up and recommended labs and tests       Follow up with Dr. whiting in 12-14 days.  No follow up labs or test are needed.             Statement of Approval     Ordered          06/29/17 1457  I have reviewed and agree with all the recommendations and orders detailed in this document.  EFFECTIVE NOW     Approved and electronically signed by:  Andrea Christensen PA-C

## 2017-06-27 NOTE — ANESTHESIA POSTPROCEDURE EVALUATION
Patient: Mickey Donald    Procedure(s):  Right total knee arthroplasty - Wound Class: I-Clean    Diagnosis:Right DJD  Diagnosis Additional Information: Degenerative osteoarthritis, primary, right knee.    Anesthesia Type:  No value filed.    Note:  Anesthesia Post Evaluation    Patient location during evaluation: PACU  Patient participation: Able to fully participate in evaluation  Level of consciousness: awake  Pain management: adequate  Airway patency: patent  Cardiovascular status: acceptable  Respiratory status: acceptable  Hydration status: acceptable  PONV: controlled     Anesthetic complications: None          Last vitals:  Vitals:    06/27/17 1335 06/27/17 1340 06/27/17 1345   BP: 135/76 138/75 (!) 147/104   Pulse:      Resp: 17 17 15   Temp:      SpO2: 97% 96% 98%         Electronically Signed By: Jason Baca DO  June 27, 2017  1:57 PM

## 2017-06-27 NOTE — IP AVS SNAPSHOT
MRN:[id number]                      After Visit Summary   6/27/2017    Mickey Donald    MRN: [id number]           Thank you!     Thank you for choosing [facility] for your care. Our goal is always to provide you with excellent care. Hearing back from our patients is one way we can continue to improve our services. Please take a few minutes to complete the written survey that you may receive in the mail after you visit. If you would like to speak to someone directly about your visit please contact Patient Relations at [phone]. Thank you!          Patient Information     Date Of Birth          1/1/1932        Designated Caregiver       Most Recent Value    Caregiver    Will someone help with your care after discharge? no [plans to go to TCU]      About your hospital stay     You were admitted on:  June 27, 2017 You last received care in the:  [facility]    You were discharged on:  June 30, 2017        Reason for your hospital stay       tka                  Who to Call     For medical emergencies, please call 911.  For non-urgent questions about your medical care, please call your primary care provider or clinic, [phone]  For questions related to your surgery, please call your surgery clinic        Attending Provider     Provider Specialty    Saqib Seaman MD Orthopaedic Surgery       Primary Care Provider Office Phone # Fax #    [facility] [phone]      After Care Instructions     Activity - Up ad christiano       With caution/walker            Advance Diet as Tolerated       Follow this diet upon discharge: Regular            Continuous Passive Motion Machine       Start CPM Day of Surgery.  0 - 90 degrees. Advance as tolerated.            General info for SNF       Length of Stay Estimate: Short Term Care: Estimated # of Days <30  Condition at Discharge: Improving  Level of care:skilled   Rehabilitation Potential: Excellent  Admission  "H&P remains valid and up-to-date: Yes  Recent Chemotherapy: N/A  Use Nursing Home Standing Orders: N/A            Mantoux instructions       Give two-step Mantoux (PPD) Per Facility Policy Yes            Wound care       Leave aquacel in place until post-op apt.                  Follow-up Appointments     Follow Up and recommended labs and tests       Follow up with Dr. seaman in 12-14 days.  No follow up labs or test are needed.                  Additional Services     Occupational Therapy Adult Consult       Evaluate and treat as clinically indicated.    Reason:  S/p tka            Physical Therapy Adult Consult       Evaluate and treat as clinically indicated.    Reason:  S/p tka                  Pending Results     No orders found from 6/25/2017 to 6/28/2017.            Statement of Approval     Ordered          06/29/17 1457  I have reviewed and agree with all the recommendations and orders detailed in this document.  EFFECTIVE NOW     Approved and electronically signed by:  Andrea Christensen PA-C             Admission Information     Date & Time Provider Department Dept. Phone    6/27/2017 Saqib Seaman MD [facility] [phone]      Your Vitals Were     Blood Pressure Pulse Temperature Respirations Height Weight    135/41 (BP Location: Left arm) 86 97.3  F (36.3  C) (Oral) 16 1.753 m (5' 9\") 76.2 kg (168 lb)    Pulse Oximetry BMI (Body Mass Index)                98% 24.81 kg/m2          [employer] Information     [employer] lets you send messages to your doctor, view your test results, renew your prescriptions, schedule appointments and more. To sign up, go to [email or web address] . Click on \"Log in\" on the left side of the screen, which will take you to the Welcome page. Then click on \"Sign up Now\" on the right side of the page.     You will be asked to enter the access code listed below, as well as some personal information. Please follow the directions to create your username and " password.     Your access code is: [email or web address]  Expires: 2017  7:48 AM     Your access code will  in 90 days. If you need help or a new code, please call your [facility] clinic or [phone].        Care EveryWhere ID     This is your Care EveryWhere ID. This could be used by other organizations to access your [facility] medical records  [id number]        Equal Access to Services     VARUN DANIEL : Hadii aad ku hadasho Soomaali, waaxda luqadaha, qaybta kaalmada adeegyada, waxay ansleyin haycarolynn mary annisa ni lapaola . So [facility] [phone].    ATENCIÓN: Si habla español, tiene a greenwood disposición servicios gratuitos de asistencia lingüística. Llame al [phone].    We comply with applicable federal civil rights laws and [place] laws. We do not discriminate on the basis of race, color, national origin, age, disability sex, sexual orientation or gender identity.               Review of your medicines      START taking        Dose / Directions    aspirin 81 MG EC tablet   Used for:  Status post total right knee replacement        Dose:  81 mg   Take 1 tablet (81 mg) by mouth 2 times daily   Quantity:  84 tablet   Refills:  1       COMPRESSION STOCKINGS   Used for:  Status post total right knee replacement        Dose:  1 each   1 each continuous   Quantity:  1 each   Refills:  0       docusate sodium 100 MG tablet   Commonly known as:  COLACE   Used for:  Status post total right knee replacement        Dose:  100 mg   Take 100 mg by mouth 2 times daily for 7 days   Quantity:  60 tablet   Refills:  1       hydrOXYzine 25 MG tablet   Commonly known as:  ATARAX   Used for:  Status post total right knee replacement        Dose:  25 mg   Take 1 tablet (25 mg) by mouth every 6 hours as needed for itching   Quantity:  60 tablet   Refills:  1       oxyCODONE 5 MG capsule   Commonly known as:  OXY-IR   Used for:  Status post total right knee replacement        2 tabs every 4 hours as needed for pain.   Quantity:  60  capsule   Refills:  0       polyethylene glycol powder   Commonly known as:  MIRALAX   Used for:  Status post total right knee replacement        Dose:  1 capful   Take 17 g (1 capful) by mouth 2 times daily as needed for constipation   Quantity:  510 g   Refills:  1       senna-docusate 8.6-50 MG per tablet   Commonly known as:  SENOKOT-S;PERICOLACE   Used for:  Status post total right knee replacement        Dose:  1 tablet   Take 1 tablet by mouth 2 times daily for 7 days   Quantity:  100 tablet   Refills:  0         CONTINUE these medicines which have NOT CHANGED        Dose / Directions    AMLODIPINE BESYLATE PO        Dose:  10 mg   Take 10 mg by mouth daily   Refills:  0       ASPIRIN PO        Dose:  81 mg   Take 81 mg by mouth daily   Refills:  0       ISOSORBIDE DINITRATE PO        Dose:  60 mg   Take 60 mg by mouth daily   Refills:  0       LISINOPRIL PO        Dose:  20 mg   Take 20 mg by mouth daily   Refills:  0       PLAVIX PO        Dose:  75 mg   Take 75 mg by mouth daily   Refills:  0       PRAVASTATIN SODIUM PO        Dose:  20 mg   Take 20 mg by mouth every evening   Refills:  0         STOP taking     mupirocin 2 % nasal ointment   Commonly known as:  BACTROBAN                Where to get your medicines      These medications were sent to [place], [place] - [address] [address] [address]     Phone:  [phone]     hydrOXYzine 25 MG tablet         Some of these will need a paper prescription and others can be bought over the counter. Ask your nurse if you have questions.     Bring a paper prescription for each of these medications     oxyCODONE 5 MG capsule       You don't need a prescription for these medications     aspirin 81 MG EC tablet    COMPRESSION STOCKINGS    docusate sodium 100 MG tablet    polyethylene glycol powder    senna-docusate 8.6-50 MG per tablet                Protect others around you: Learn how to safely  use, store and throw away your medicines at www.disposemymeds.org.             Medication List: This is a list of all your medications and when to take them. Check marks below indicate your daily home schedule. Keep this list as a reference.      Medications           Morning Afternoon Evening Bedtime As Needed    AMLODIPINE BESYLATE PO   Take 10 mg by mouth daily   Last time this was given:  10 mg on 6/30/2017  8:20 AM                                aspirin 81 MG EC tablet   Take 1 tablet (81 mg) by mouth 2 times daily   Last time this was given:  81 mg on 6/30/2017  8:20 AM                                ASPIRIN PO   Take 81 mg by mouth daily   Last time this was given:  81 mg on 6/30/2017  8:20 AM                                COMPRESSION STOCKINGS   1 each continuous                                docusate sodium 100 MG tablet   Commonly known as:  COLACE   Take 100 mg by mouth 2 times daily for 7 days                                hydrOXYzine 25 MG tablet   Commonly known as:  ATARAX   Take 1 tablet (25 mg) by mouth every 6 hours as needed for itching   Last time this was given:  10 mg on 6/27/2017  8:24 PM                                ISOSORBIDE DINITRATE PO   Take 60 mg by mouth daily   Last time this was given:  60 mg on 6/30/2017  8:20 AM                                LISINOPRIL PO   Take 20 mg by mouth daily   Last time this was given:  20 mg on 6/29/2017  8:18 PM                                oxyCODONE 5 MG capsule   Commonly known as:  OXY-IR   2 tabs every 4 hours as needed for pain.                                PLAVIX PO   Take 75 mg by mouth daily   Last time this was given:  75 mg on 6/30/2017  8:20 AM                                polyethylene glycol powder   Commonly known as:  MIRALAX   Take 17 g (1 capful) by mouth 2 times daily as needed for constipation                                PRAVASTATIN SODIUM PO   Take 20 mg by mouth every evening   Last time this was given:  20 mg on  6/29/2017  8:18 PM                                senna-docusate 8.6-50 MG per tablet   Commonly known as:  SENOKOT-S;PERICOLACE   Take 1 tablet by mouth 2 times daily for 7 days   Last time this was given:  2 tablets on 6/30/2017  8:20 AM

## 2017-06-27 NOTE — IP AVS SNAPSHOT
` ` Patient Information     Patient Name Sex     Mickey Donald ([id number]) Male 1932       Room Bed    620      Patient Demographics     Address Phone    [address] [place] [address] [phone] (Home)  [phone] (Work)  [phone] (Mobile)      Patient Ethnicity & Race     Ethnic Group Patient Race    American White      Emergency Contact(s)     Name Relation Home Work Mobile    Liv Valdovinos Daughter [phone] none none    no secondary contact          Documents on File        Status Date Received Description       Documents for the Patient    Privacy Notice - [place] Received 13     Insurance Card Received 13     External Medication Information Consent       Patient ID Received 13     Consent for Services - Hospital/Clinic Received () 13     Consent for EHR Access Received 13     [facility] Specified Other       Consent to Communicate Received 13     Insurance Card       Patient ID Received 17     Insurance Card Received 13     Insurance Card       Insurance Card Received 13     Consent for Services/Privacy Notice - Hospital/Clinic Received 17     Insurance Card Received 17     Patient ID Received 17        Documents for the Encounter    CMS IM for Patient Signature Received 17     H/P - History and Physical  17 H AND P - [facility] 17      Admission Information     Attending Provider Admitting Provider Admission Type Admission Date/Time    Saqib Seaman MD Hartman, Robert, MD Elective 17  1012    Discharge Date Hospital Service Auth/Cert Status Service Area     Surgery Incomplete [facility]    Unit Room/Bed Admission Status        ORTHO SPINE  Admission (Confirmed)       Admission     Complaint    Right DJD, Degenerative arthritis of knee      Hospital Account     Name Acct ID Class Status Primary Coverage    Mickey Donald  [id number] Inpatient Open MEDICARE - MEDICARE FOR HB SUPPLEMENT            Guarantor Account (for Hospital Account #[id number])     Name Relation to Pt Service Area Active? Acct Type    Mickey Donald Self FCS Yes Personal/Family    Address Phone          [address] [address] [phone](H)              Coverage Information (for Hospital Account #[id number])     1. MEDICARE/MEDICARE FOR HB SUPPLEMENT     F/O Payor/Plan Precert #    MEDICARE/MEDICARE FOR HB SUPPLEMENT     Subscriber Subscriber #    Mickey Donald [id number]    Address Phone    [employer] CLAIM[employer]  [address]  [place] [address] [phone]          2. BCBS/BCBS PLATINUM BLUE     F/O Payor/Plan Precert #    BCBS/BCBS PLATINUM BLUE     Subscriber Subscriber #    Mickey Donald [id number]    Address Phone    [address] [address]  [place], [place] [id number] [phone]

## 2017-06-27 NOTE — OP NOTE
[facility]  Daily Post-Op Note    Cemented Total Knee Arthroplasty - Male    Mickey Donald MRN# [id number]   YOB: 1932  Procedure Date:  6/27/2017  Age: 85 year old       PREOPERATIVE DIAGNOSIS:   Degenerative osteoarthritis, primary,right  knee.    POSTOPERATIVE DIAGNOSIS:   Degenerative osteoarthritis, primary, right} knee.    OPERATIVE PROCEDURE:  right total knee arthroplasty.    SURGEON:  Saqib Seaman M.D.    FIRST ASSISTANT:  Andrea Christensen PA-C.    ANESTHESIA:  Block with general.    ANESTHESIOLOGIST:     INDICATIONS:  Mr. Mickey Donald is a 85 year old-year-old man with advanced primary degenerative osteoarthritis involving the medial and patellofemoral compartments of his right knee.  He has failed nonoperative care with anti-inflammatory drugs, corticosteroid injections, and reduced activity.  He was brought to surgery for total knee arthroplasty.    PROCEDURE:  After obtaining informed surgical consent, the patient was brought to the operating room.  2 grams of Ancef was administered intravenously one hour prior to surgery and will be discontinued within 24 hours.  Coumadin was administered preoperatively. He was given both the block and a general anesthetic.  The right leg was prepped and draped in the usual sterile fashion.  Following exsanguination, the tourniquet was inflated.  A midline incision was made over the patella.  A medial parapatellar incision was used to enter the joint.  The menisci, ACL, and osteophytes were debrided and discarded.  The intramedullary canal of the femur was opened.  The intramedullary cutting system was applied.  The distal femoral cut was made at 5 degrees of valgus and 8 mm of resection.  He had a significantly valgus stress and more bone was taken medially than laterally.  Jig #2 was applied and appropriate drill holes neutrally aligned at the epicondylar notch were created.  A size # 6-universal cutting block was then  positioned on the distal femur.  The anterior, posterior, and chamfer cuts were completed.  The chamfer bone was then used to bone graft the femoral tunnel.  The proximal tibia was exposed.  The posterior cruciate ligament was spared on a bone island.  The tibia was resected and neutral varus, valgus, 5 degrees of posterior slope.  A size # 6 tray covered nicely.  The tray was neutrally aligned at the femoral component.  Proximal tibia was then punched.  The underside of the patella was then resected and a # 36 template was used to create drill holes.  The knee was irrigated with antibiotic irrigating solution.  All components were implanted with the use of cement.  Ultimately, an 13-mm tibial bearing was implanted.  The knee was stable throughout and full range of motion.  The wound was irrigated thoroughly and closed in layers over drains with #1 Vicryl, 2-0 Vicryl, and staples.  Sterile dressing was applied.  The tourniquet was deflated.  No intraoperative complications.  Blood loss was minimal.  Sponge and needle counts were correct.    The patient returned to the recovery room in stable condition.    Saqib Seaman M.D.

## 2017-06-27 NOTE — LETTER
Transition Communication Hand-off for Care Transitions to Next Level of Care Provider    Name: Mickey Donald  MRN #: [id number]  Primary Care Provider: Young Henley  Primary Care MD Name: Kale  Primary Clinic: [address] [place] [address]     Reason for Hospitalization:  Right DJD  Degenerative arthritis of knee  Admit Date/Time: 6/27/2017 10:12 AM  Discharge Date: 6/30/2017   Payor Source: Payor: BCBS / Plan: BCBS PLATINUM BLUE / Product Type: PPO /     Readmission Assessment Measure (KELSEY) Risk Score/category: LOW     Patient will receive the following: TCU  [facility]     Mayra Rust    AVS/Discharge Summary is the source of truth; this is a helpful guide for improved communication of patient story

## 2017-06-27 NOTE — PROGRESS NOTES
"SPIRITUAL HEALTH SERVICES  SPIRITUAL ASSESSMENT Progress Note  [facility] OR [facility] E    PRIMARY FOCUS:     Goals of care    Emotional/spiritual/Buddhist distress    Support for coping    ILLNESS CIRCUMSTANCES:   Reviewed documentation. Reflective conversation shared with Nima and his daughter Lidia which integrated elements of illness and family narratives.     Context of Serious Illness/Symptom(s) - Knee replacement surgery. \"I don't want to be able to run a marathon, but I'd like to be able to walk one.\"    Persons/Resources Involved - His family is very involved and supported. He joked, \"sometimes it feels like I don't have enough family, other times it feels like I have too much.\"    DISTRESS:     Emotional/Existential/Relational Distress - none expressed     Spiritual/Anabaptism Distress - none expressed     Social/Cultural/Economic Distress - none expressed     SPIRIT (Coping):     Christianity/Marah - Yazdanism     Spiritual Practice(s) - Prayer and gladly welcomed prayer. Also enjoys hugs because he comes from a family of huggers.    Emotional/Existential/Relational Connections - Knows Chaplain Bj Campbell at [facility] very well from his wife's stay there.    SENSE-MAKING:    Goals of Care - Will stay in the hospital for a couple days and then transition to a TCU. He is open to [facility] at the suggestion of his doctor, but would also enjoy [facility] because he is so familiar with it.     Meaning/Sense-Making - Finds a lot of ethan in his family    PLAN: [facility] will continue to be available per patient/family/staff request.      Ger Carrion  Chaplain Resident  Pager [phone]    "

## 2017-06-28 ENCOUNTER — APPOINTMENT (OUTPATIENT)
Dept: PHYSICAL THERAPY | Facility: CLINIC | Age: 82
DRG: 470 | End: 2017-06-28
Attending: ORTHOPAEDIC SURGERY
Payer: MEDICARE

## 2017-06-28 LAB
GLUCOSE SERPL-MCNC: 185 MG/DL (ref 70–99)
HBA1C MFR BLD: 5.6 % (ref 4.3–6)
HGB BLD-MCNC: 8.6 G/DL (ref 13.3–17.7)
INR PPP: 1.17 (ref 0.86–1.14)
INTERPRETATION ECG - MUSE: NORMAL

## 2017-06-28 PROCEDURE — 97116 GAIT TRAINING THERAPY: CPT | Mod: GP | Performed by: PHYSICAL THERAPIST

## 2017-06-28 PROCEDURE — 40000193 ZZH STATISTIC PT WARD VISIT: Performed by: PHYSICAL THERAPIST

## 2017-06-28 PROCEDURE — A9270 NON-COVERED ITEM OR SERVICE: HCPCS | Mod: GY | Performed by: PHYSICIAN ASSISTANT

## 2017-06-28 PROCEDURE — 97110 THERAPEUTIC EXERCISES: CPT | Mod: GP | Performed by: PHYSICAL THERAPIST

## 2017-06-28 PROCEDURE — 25000132 ZZH RX MED GY IP 250 OP 250 PS 637: Mod: GY | Performed by: INTERNAL MEDICINE

## 2017-06-28 PROCEDURE — 99232 SBSQ HOSP IP/OBS MODERATE 35: CPT | Performed by: INTERNAL MEDICINE

## 2017-06-28 PROCEDURE — 97530 THERAPEUTIC ACTIVITIES: CPT | Mod: GP | Performed by: PHYSICAL THERAPIST

## 2017-06-28 PROCEDURE — 25000132 ZZH RX MED GY IP 250 OP 250 PS 637: Mod: GY | Performed by: ORTHOPAEDIC SURGERY

## 2017-06-28 PROCEDURE — 36415 COLL VENOUS BLD VENIPUNCTURE: CPT | Performed by: ORTHOPAEDIC SURGERY

## 2017-06-28 PROCEDURE — 25000128 H RX IP 250 OP 636: Performed by: ORTHOPAEDIC SURGERY

## 2017-06-28 PROCEDURE — 25800025 ZZH RX 258: Performed by: ORTHOPAEDIC SURGERY

## 2017-06-28 PROCEDURE — 12000000 ZZH R&B MED SURG/OB

## 2017-06-28 PROCEDURE — 25000132 ZZH RX MED GY IP 250 OP 250 PS 637: Mod: GY | Performed by: PHYSICIAN ASSISTANT

## 2017-06-28 PROCEDURE — 97161 PT EVAL LOW COMPLEX 20 MIN: CPT | Mod: GP | Performed by: PHYSICAL THERAPIST

## 2017-06-28 PROCEDURE — 82947 ASSAY GLUCOSE BLOOD QUANT: CPT | Performed by: ORTHOPAEDIC SURGERY

## 2017-06-28 PROCEDURE — 85018 HEMOGLOBIN: CPT | Performed by: ORTHOPAEDIC SURGERY

## 2017-06-28 PROCEDURE — 83036 HEMOGLOBIN GLYCOSYLATED A1C: CPT | Performed by: ORTHOPAEDIC SURGERY

## 2017-06-28 PROCEDURE — 85610 PROTHROMBIN TIME: CPT | Performed by: ORTHOPAEDIC SURGERY

## 2017-06-28 PROCEDURE — A9270 NON-COVERED ITEM OR SERVICE: HCPCS | Mod: GY | Performed by: ORTHOPAEDIC SURGERY

## 2017-06-28 PROCEDURE — A9270 NON-COVERED ITEM OR SERVICE: HCPCS | Mod: GY | Performed by: INTERNAL MEDICINE

## 2017-06-28 RX ORDER — OXYCODONE HYDROCHLORIDE 5 MG/1
5-10 TABLET ORAL
Status: DISCONTINUED | OUTPATIENT
Start: 2017-06-28 | End: 2017-06-30 | Stop reason: HOSPADM

## 2017-06-28 RX ADMIN — OXYCODONE HYDROCHLORIDE 5 MG: 5 TABLET ORAL at 20:12

## 2017-06-28 RX ADMIN — CLOPIDOGREL 75 MG: 75 TABLET, FILM COATED ORAL at 08:20

## 2017-06-28 RX ADMIN — ACETAMINOPHEN 975 MG: 325 TABLET, FILM COATED ORAL at 15:59

## 2017-06-28 RX ADMIN — OXYCODONE HYDROCHLORIDE 10 MG: 5 TABLET ORAL at 15:17

## 2017-06-28 RX ADMIN — ACETAMINOPHEN 975 MG: 325 TABLET, FILM COATED ORAL at 00:25

## 2017-06-28 RX ADMIN — OXYCODONE HYDROCHLORIDE 5 MG: 5 TABLET ORAL at 23:05

## 2017-06-28 RX ADMIN — OXYCODONE HYDROCHLORIDE 5 MG: 5 TABLET ORAL at 00:25

## 2017-06-28 RX ADMIN — AMLODIPINE BESYLATE 10 MG: 10 TABLET ORAL at 08:20

## 2017-06-28 RX ADMIN — OXYCODONE HYDROCHLORIDE 5 MG: 5 TABLET ORAL at 12:33

## 2017-06-28 RX ADMIN — SENNOSIDES AND DOCUSATE SODIUM 1 TABLET: 8.6; 5 TABLET ORAL at 08:20

## 2017-06-28 RX ADMIN — LISINOPRIL 20 MG: 20 TABLET ORAL at 20:07

## 2017-06-28 RX ADMIN — PRAVASTATIN SODIUM 20 MG: 20 TABLET ORAL at 20:07

## 2017-06-28 RX ADMIN — ASPIRIN 81 MG 81 MG: 81 TABLET ORAL at 08:20

## 2017-06-28 RX ADMIN — POTASSIUM CHLORIDE, DEXTROSE MONOHYDRATE AND SODIUM CHLORIDE: 150; 5; 450 INJECTION, SOLUTION INTRAVENOUS at 03:46

## 2017-06-28 RX ADMIN — OXYCODONE HYDROCHLORIDE 5 MG: 5 TABLET ORAL at 03:40

## 2017-06-28 RX ADMIN — CEFAZOLIN SODIUM 1 G: 1 INJECTION, SOLUTION INTRAVENOUS at 03:36

## 2017-06-28 RX ADMIN — ISOSORBIDE DINITRATE 60 MG: 20 TABLET ORAL at 08:20

## 2017-06-28 RX ADMIN — ACETAMINOPHEN 975 MG: 325 TABLET, FILM COATED ORAL at 08:20

## 2017-06-28 RX ADMIN — SENNOSIDES AND DOCUSATE SODIUM 1 TABLET: 8.6; 5 TABLET ORAL at 20:07

## 2017-06-28 ASSESSMENT — PAIN DESCRIPTION - DESCRIPTORS
DESCRIPTORS: DULL;ACHING
DESCRIPTORS: ACHING;CONSTANT

## 2017-06-28 NOTE — PLAN OF CARE
Problem: Goal Outcome Summary  Goal: Goal Outcome Summary  OT-Orders received and chart reviewed. Met with patient who reports he is planning on discharging to TCU as he lives alone. After review of OT POC and options, decision made by patient and OT to defer OT today and revisit plan tomorrow, if patient is transfer to TCU, will complete order and defer to next level of care. If to return home, will initiate OT on 6/29.

## 2017-06-28 NOTE — PROVIDER NOTIFICATION
-Received call back from Andrea Christensen PA-C for Dr. Seaman. Ok for patient to resume Aspirin and Plavix as ordered. He does not need to be on Coumadin for DVT prophylaxis in addition to ASA and Plavix.

## 2017-06-28 NOTE — PROGRESS NOTES
Arrived to room 620 from PACU at 1500 via cart, transferred to bed via hover mat without difficulty, alert and oriented x 4, oriented to room and call system, dressing CDI, CMS intact, IV patent and infusing, claire patent, denies pain, reviewed welcome folder and pain/medication information packet with patient and daughter. SCD's on BLE. Bri ice chips well. Frequent VS started.

## 2017-06-28 NOTE — PROGRESS NOTES
06/28/17 1224   Quick Adds   Type of Visit Initial PT Evaluation   Living Environment   Lives With alone   Living Arrangements house   Number of Stairs to Enter Home 2   Number of Stairs Within Home 13  (down to laundry)   Stair Railings at Home inside, present on left side;outside, present on left side   Transportation Available family or friend will provide   Self-Care   Dominant Hand right   Usual Activity Tolerance good   Current Activity Tolerance moderate   Regular Exercise no   Equipment Currently Used at Home none   Activity/Exercise/Self-Care Comment Owns walkers, cane   Functional Level Prior   Ambulation 0-->independent   Transferring 0-->independent   Toileting 0-->independent   Bathing 0-->independent   Dressing 0-->independent   Eating 0-->independent   Communication 0-->understands/communicates without difficulty   Swallowing 0-->swallows foods/liquids without difficulty   Cognition 0 - no cognition issues reported   Fall history within last six months no   Which of the above functional risks had a recent onset or change? ambulation;transferring   General Information   Onset of Illness/Injury or Date of Surgery - Date 06/27/17   Referring Physician Saqib Seaman MD   Patient/Family Goals Statement TCU   Pertinent History of Current Problem (include personal factors and/or comorbidities that impact the POC) Pt is POD1 R TKA. Hx of CAD with 3 stents, moderate AS, PVCs, HTN, dyslipidemia, inguinal hernia repair, closed head injury.   Precautions/Limitations fall precautions   Weight-Bearing Status - LLE full weight-bearing   Weight-Bearing Status - RLE weight-bearing as tolerated   General Observations Supine upon initiation. Agreeable to TP.    Cognitive Status Examination   Orientation orientation to person, place and time   Level of Consciousness alert   Follows Commands and Answers Questions 100% of the time   Personal Safety and Judgment intact   Memory intact   Pain Assessment   Patient  "Currently in Pain Yes, see Vital Sign flowsheet   Integumentary/Edema   Integumentary/Edema Comments Ace wrap present on R LE. Small spot of blood on lower medial knee. Nursing updated.    Posture    Posture Forward head position;Protracted shoulders   Range of Motion (ROM)   ROM Comment R knee flexion limited to ~45 degrees   Strength   Strength Comments able to SLR   Bed Mobility   Bed Mobility Comments sit<>supine with SBA   Transfer Skills   Transfer Comments Sit<>stand CGA   Gait   Gait Comments CGA with FWW   Sensory Examination   Sensory Perception no deficits were identified   Coordination   Coordination no deficits were identified   Muscle Tone   Muscle Tone no deficits were identified   Modality Interventions   Planned Modality Interventions Cryotherapy   General Therapy Interventions   Planned Therapy Interventions bed mobility training;gait training;ROM;strengthening;stretching;transfer training;home program guidelines   Clinical Impression   Criteria for Skilled Therapeutic Intervention yes, treatment indicated   PT Diagnosis impaired mobility   Influenced by the following impairments pain, weakness, decreased motion   Functional limitations due to impairments difficulty with bed mobility, ambulation, transfers, stairs   Clinical Presentation Stable/Uncomplicated   Clinical Presentation Rationale medically stable   Clinical Decision Making (Complexity) Low complexity   Therapy Frequency` 2 times/day   Predicted Duration of Therapy Intervention (days/wks) 3 days   Anticipated Discharge Disposition Transitional Care Facility   Risk & Benefits of therapy have been explained Yes   Patient, Family & other staff in agreement with plan of care Yes   [facility] [employer] TM \"6 Clicks\"   2016, Trustees of [facility], under license to [employer].  All rights reserved.   6 Clicks Short Forms Basic Mobility Inpatient Short Form   [facility] AM-PAC  \"6 Clicks\" V.2 Basic Mobility Inpatient Short " Form   1. Turning from your back to your side while in a flat bed without using bedrails? 4 - None   2. Moving from lying on your back to sitting on the side of a flat bed without using bedrails? 4 - None   3. Moving to and from a bed to a chair (including a wheelchair)? 3 - A Little   4. Standing up from a chair using your arms (e.g., wheelchair, or bedside chair)? 3 - A Little   5. To walk in hospital room? 3 - A Little   6. Climbing 3-5 steps with a railing? 2 - A Lot   Basic Mobility Raw Score (Score out of 24.Lower scores equate to lower levels of function) 19   Total Evaluation Time   Total Evaluation Time (Minutes) 10

## 2017-06-28 NOTE — PLAN OF CARE
Problem: Goal Outcome Summary  Goal: Goal Outcome Summary  Outcome: Improving  pts pain was 6 pt given 5mg oxy and at recheck pt stated no change in pain.  MD notified orders received. pts cms intact.  Pt is up with standby assist and walker.  Pt is tolerating diet. Pt is planning to go to TCU at discharge.

## 2017-06-28 NOTE — PROGRESS NOTES
"[facility] Post-Op Note    Mickey Donald MRN# [id number]   YOB: 1932 Age: 85 year old              Subjective:  Feels well, no pain.          Physical Exam:  Blood pressure 104/52, pulse 94, temperature 98  F (36.7  C), temperature source Oral, resp. rate 18, height 1.753 m (5' 9\"), weight 76.2 kg (168 lb), SpO2 91 %.  Wound clean and dry with minimal or no drainage.  Surrounding skin with minimal erythema.  Dressing dry and intact.    CMS is intact.    Calves non-tender bilaterally.          Data:  CBC:    Recent Labs  Lab 06/28/17  0705   HGB 8.6*     INR:  Recent Labs  Lab 06/28/17  0705 06/27/17  1623   INR 1.17* 1.06             Assessment and Plan:   Stable, s/p tka  D/c Friday.       Andrea Christensen PA-C    "

## 2017-06-28 NOTE — PROGRESS NOTES
PAVITHRA      D: Per MD request to assist with discharge planning, arrange home CPM. Chart reviewed noting pt's admit yesterday after surgery for R TKA, noted PT assessment with documentation of pt's planning for his transfer to rehab facility on discharge. SW has noted pre-surgery contact by care coordinator (see notes from 6/20) noting of pt's facilities preferences of [facility] and [facility] for rehab stay on discharge. MD discharge plan not noted at this time. Pt lives alone in his home in [place], prior to surgery he had been independent with ambulation and ADLs.      A: Based on progress and that pt lives alone, limited family availability for support at home anticipate consideration of his transfer to rehab facility on discharge.      P: Will await MD determination of discharge plan, and PT recommendations for discharge.. once determined SW will meet with pt for planning.

## 2017-06-28 NOTE — PLAN OF CARE
End of Shift Summary.  For vital signs and complete assessments, please see documentation flowsheets.     Pertinent assessments: pt alert and oriented, taking oxycodone for pain with good relief. Pt with good urine output via claire. VSS, afebrile. knee dressing dry and intact.   Major Shift Events: none, slept well between cares  Plan (Upcoming Events): continue current cares, PT/OT  Discharge/Transfer Needs: will need TCU on discharge    Bedside Shift Report Completed :   Bedside Safety Check Completed:

## 2017-06-28 NOTE — PLAN OF CARE
Problem: Goal Outcome Summary  Goal: Goal Outcome Summary  PT: Orders received. PT evaluation completed and treatment initiated. Pt reports living alone in a home with 2 steps to enter. Pt owns a walker and cane. Plans on TCU secondary to living alone.      Discharge Planner PT   Patient plan for discharge: TCU  Current status: Pt completes sit<>supine with SBA. Sit<>stand with CGA and cuing for safety. Ambulates with FWW and CGA>SBA. No KI needed.   Barriers to return to prior living situation: lives alone, steps to enter.   Recommendations for discharge: TBD POD2  Rationale for recommendations: TBD POD2       Entered by: Génesis Louis 06/28/2017 12:36 PM

## 2017-06-28 NOTE — PROGRESS NOTES
"[facility]  Hospitalist Progress Note    Sukhwinder Pizarro MD 06/28/2017  Text Page    Reason for Stay (Diagnosis): TKA          Assessment and Plan:      Summary of Stay: Mickey Donald is a 85 year old male admitted on 6/27/2017 after R TKA.  Hx of CAD with 3 stents, moderate AS, PVCs, HTN, dyslipidemia, inguinal hernia repair, closed head injury.     Problem List:   1. Post op R TKA-  doing well.  Pain well controlled.  Continue with PT and pain control.  2. HTN- BP is stable.  Resumed lisinopril, amlodipine, imdur with parameters.   3. CAD- currently asymptomatic.  Resumed ASA and plavix-- OK with ortho.  On ASA, plavix and coumadin is new for prophylaxis--watch for bleeding.    4. AS- no current symptoms.  5. Dyslipidemia- resume pravastatin        DVT Prophylaxis: Warfarin  Code Status: Full Code         Interval History (Subjective):      Feels well.  Occasionally feels PVCs which is chronic.                Physical Exam:      Last Vital Signs:  /52 (BP Location: Right arm)  Pulse 94  Temp 98  F (36.7  C) (Oral)  Resp 18  Ht 1.753 m (5' 9\")  Wt 76.2 kg (168 lb)  SpO2 91%  BMI 24.81 kg/m2       Vital signs reviewed  General:  Alert, calm, NAD  CV: regular rate and rhythm, 3/6 LAWSON  Lungs:  Clear to ascultation bilaterally, normal respiratory effort  HEENT:  Pupil round, equal, conjuctivae, sclerae and lids normal, neck is supple  Abdomen:  Soft, nontender, nondistended, no masses, normal bowel sounds  Extremities:  No edema.  R leg wrapped.   Neuro: normal strength and sensation in all 4 extremities, cranial nerves grossly intact  Psychiatric:  Mood and affect within normal limits             Medications:      All current medications were reviewed with changes reflected in problem list.          Data:      All new lab and imaging data was reviewed.         "

## 2017-06-28 NOTE — PLAN OF CARE
Problem: Goal Outcome Summary  Goal: Goal Outcome Summary  Outcome: Improving  Jannette RN  VS monitored, tachycardia (100-114) but asymptomatic, up w\A2 and ww, KI when OOB, mel C/D/I, CMS+, claire patent w/adequate OP, Tylenol/Oxycodone/Vistaril for pain, cristina reg diet, BS+, plan is TCU upon d/c as pt lives alone, will cont to monitor.

## 2017-06-29 ENCOUNTER — APPOINTMENT (OUTPATIENT)
Dept: PHYSICAL THERAPY | Facility: CLINIC | Age: 82
DRG: 470 | End: 2017-06-29
Attending: ORTHOPAEDIC SURGERY
Payer: MEDICARE

## 2017-06-29 LAB
GLUCOSE SERPL-MCNC: 111 MG/DL (ref 70–99)
HGB BLD-MCNC: 7.7 G/DL (ref 13.3–17.7)
INR PPP: 1.13 (ref 0.86–1.14)

## 2017-06-29 PROCEDURE — 85018 HEMOGLOBIN: CPT | Performed by: ORTHOPAEDIC SURGERY

## 2017-06-29 PROCEDURE — 85610 PROTHROMBIN TIME: CPT | Performed by: ORTHOPAEDIC SURGERY

## 2017-06-29 PROCEDURE — 99207 ZZC CDG-MDM COMPONENT: MEETS MODERATE - DOWN CODED: CPT | Performed by: INTERNAL MEDICINE

## 2017-06-29 PROCEDURE — A9270 NON-COVERED ITEM OR SERVICE: HCPCS | Mod: GY | Performed by: INTERNAL MEDICINE

## 2017-06-29 PROCEDURE — 99232 SBSQ HOSP IP/OBS MODERATE 35: CPT | Performed by: INTERNAL MEDICINE

## 2017-06-29 PROCEDURE — 25000132 ZZH RX MED GY IP 250 OP 250 PS 637: Mod: GY | Performed by: INTERNAL MEDICINE

## 2017-06-29 PROCEDURE — 36415 COLL VENOUS BLD VENIPUNCTURE: CPT | Performed by: ORTHOPAEDIC SURGERY

## 2017-06-29 PROCEDURE — 25000132 ZZH RX MED GY IP 250 OP 250 PS 637: Mod: GY | Performed by: PHYSICIAN ASSISTANT

## 2017-06-29 PROCEDURE — A9270 NON-COVERED ITEM OR SERVICE: HCPCS | Mod: GY | Performed by: PHYSICIAN ASSISTANT

## 2017-06-29 PROCEDURE — 97110 THERAPEUTIC EXERCISES: CPT | Mod: GP | Performed by: PHYSICAL THERAPIST

## 2017-06-29 PROCEDURE — 97116 GAIT TRAINING THERAPY: CPT | Mod: GP | Performed by: PHYSICAL THERAPIST

## 2017-06-29 PROCEDURE — 40000894 ZZH STATISTIC OT IP EVAL DEFER: Performed by: STUDENT IN AN ORGANIZED HEALTH CARE EDUCATION/TRAINING PROGRAM

## 2017-06-29 PROCEDURE — 40000193 ZZH STATISTIC PT WARD VISIT: Performed by: PHYSICAL THERAPIST

## 2017-06-29 PROCEDURE — 12000000 ZZH R&B MED SURG/OB

## 2017-06-29 PROCEDURE — 82947 ASSAY GLUCOSE BLOOD QUANT: CPT | Performed by: ORTHOPAEDIC SURGERY

## 2017-06-29 PROCEDURE — A9270 NON-COVERED ITEM OR SERVICE: HCPCS | Mod: GY | Performed by: ORTHOPAEDIC SURGERY

## 2017-06-29 PROCEDURE — 25000132 ZZH RX MED GY IP 250 OP 250 PS 637: Mod: GY | Performed by: ORTHOPAEDIC SURGERY

## 2017-06-29 RX ORDER — OXYCODONE HYDROCHLORIDE 5 MG/1
CAPSULE ORAL
Qty: 60 CAPSULE | Refills: 0 | Status: ON HOLD | OUTPATIENT
Start: 2017-06-29 | End: 2021-12-01

## 2017-06-29 RX ORDER — HYDROXYZINE HYDROCHLORIDE 25 MG/1
25 TABLET, FILM COATED ORAL EVERY 6 HOURS PRN
Qty: 60 TABLET | Refills: 1 | Status: ON HOLD | OUTPATIENT
Start: 2017-06-29 | End: 2021-12-01

## 2017-06-29 RX ADMIN — ASPIRIN 81 MG 81 MG: 81 TABLET ORAL at 08:07

## 2017-06-29 RX ADMIN — ISOSORBIDE DINITRATE 60 MG: 20 TABLET ORAL at 08:06

## 2017-06-29 RX ADMIN — AMLODIPINE BESYLATE 10 MG: 10 TABLET ORAL at 08:06

## 2017-06-29 RX ADMIN — PRAVASTATIN SODIUM 20 MG: 20 TABLET ORAL at 20:18

## 2017-06-29 RX ADMIN — OXYCODONE HYDROCHLORIDE 5 MG: 5 TABLET ORAL at 12:41

## 2017-06-29 RX ADMIN — ACETAMINOPHEN 975 MG: 325 TABLET, FILM COATED ORAL at 15:48

## 2017-06-29 RX ADMIN — OXYCODONE HYDROCHLORIDE 5 MG: 5 TABLET ORAL at 18:48

## 2017-06-29 RX ADMIN — ACETAMINOPHEN 975 MG: 325 TABLET, FILM COATED ORAL at 08:07

## 2017-06-29 RX ADMIN — OXYCODONE HYDROCHLORIDE 5 MG: 5 TABLET ORAL at 08:07

## 2017-06-29 RX ADMIN — LISINOPRIL 20 MG: 20 TABLET ORAL at 20:18

## 2017-06-29 RX ADMIN — CLOPIDOGREL 75 MG: 75 TABLET, FILM COATED ORAL at 08:07

## 2017-06-29 RX ADMIN — ACETAMINOPHEN 975 MG: 325 TABLET, FILM COATED ORAL at 01:15

## 2017-06-29 RX ADMIN — SENNOSIDES AND DOCUSATE SODIUM 1 TABLET: 8.6; 5 TABLET ORAL at 08:07

## 2017-06-29 RX ADMIN — SENNOSIDES AND DOCUSATE SODIUM 2 TABLET: 8.6; 5 TABLET ORAL at 20:18

## 2017-06-29 ASSESSMENT — PAIN DESCRIPTION - DESCRIPTORS: DESCRIPTORS: SORE

## 2017-06-29 NOTE — PROGRESS NOTES
[facility]  Hospitalist Progress Note  Name: Mickey Donald    MRN: [id number]  Provider:  Adolph Lo MD  06/29/17    Initial presenting complaint/issue to hospital (Diagnosis): R TKA.         Assessment and Plan:      Summary of Stay: Mickey Donald is a 85 year old male admitted on 6/27/2017 with R TKA. Hx of CAD with 3 stents, moderate AS, PVCs, HTN, dyslipidemia, inguinal hernia repair, closed head injury.     Problem List:   1. Post op R TKA POD #2-  doing well.  Pain well controlled.  Continue with PT and pain control.  2. HTN- BP is stable.  Resumed lisinopril, amlodipine, imdur with parameters.   3. CAD-   Resumed ASA, isordil, pravachol and plavix.  4. AS- f/u as out-pt.  5. Dyslipidemia- resume pravastatin          DVT Prophylaxis: Warfarin  Code Status: Full Code                  Interval History:        Did have a very brief episode of substernal chest pain, which resolved spontaneously. Did not inform anyone. Gets these pains at home periodically. Most recent stress test wnl. No fevers/chills/active chest pain/SOB. Informed patient to let us know if he has chest pains or other symptoms.                  Physical Exam:      Last Vital Signs:  Temp: 97.5  F (36.4  C) Temp src: Oral BP: 138/72 Pulse: 98 Heart Rate: 85 Resp: 16 SpO2: 98 % O2 Device: Nasal cannula Oxygen Delivery: 2 LPM    Intake/Output Summary (Last 24 hours) at 06/29/17 1059  Last data filed at 06/29/17 0900   Gross per 24 hour   Intake             1323 ml   Output             1950 ml   Net             -627 ml     I/O last 3 completed shifts:  In: 923 [P.O.:920; I.V.:3]  Out: 1950 [Urine:1950]  Vitals:    06/23/17 1400 06/27/17 1021   Weight: 76.7 kg (169 lb) 76.2 kg (168 lb)       Gen - AAO x 3 in NAD.  Lungs - CTA B with few crackles RLL.  Heart - RR,S1+S2 nml, 4/6 systolic murmur.  Abd - soft, NT, ND, + BS.  Ext - no edema.         Medications:      All current medications were reviewed.         Data:      All  new lab and imaging data was reviewed.   Labs:  No results for input(s): CULT in the last 168 hours.    Recent Labs  Lab 06/29/17 0644 06/28/17 0705   HGB 7.7* 8.6*       Recent Labs  Lab 06/29/17  0644 06/28/17  0705 06/26/17  0715   POTASSIUM  --   --  4.0   * 185*  --    CR  --   --  1.36*   GFRESTIMATED  --   --  50*   GFRESTBLACK  --   --  60*      Recent Imaging:   No results found for this or any previous visit (from the past 24 hour(s)).

## 2017-06-29 NOTE — PLAN OF CARE
Problem: Goal Outcome Summary  Goal: Goal Outcome Summary  OT: Current plan is for transfer to TCU tomorrow in order to improve pt's strength, balance, and safety prior to return home where pt lives alone and is independent in all cares. At this time will complete IP OT consult and defer full evaluation and treatment to next level of care facility. Thank you for consult.

## 2017-06-29 NOTE — PROGRESS NOTES
SWS  Met with pt to discuss TCU.  His preference, after talking to Dr. Seaman, is to go to [facility].  His second choice is [facility].  Referrals made.  P:  Following    [facility] does not have a bed.  [facility] has a private room for him.  He will go there tomorrow.  He would like dtr to drive him and believes he can get in/out of a car.  Will complete PAS.  P:  Following

## 2017-06-29 NOTE — PLAN OF CARE
Problem: Goal Outcome Summary  Goal: Goal Outcome Summary  Outcome: Improving  Pt up with 1 assist, walker, and gait belt to the bedside commode. Pain controlled with oxycodone.  LS clear, BS present, passing flatus. CMS intact with no numbness/tingling. Drsg was changed d/t saturation. New aquacel applied and is CDI. Incision intact with staples. Voiding adequately.   At beginning of shift had an episode of chest discomfort, which only happened when the patient was getting into a sitting position from a lying position. Pt stated that this happens at home from time to time and that it he had no more discomfort once he was on the commode and then back into bed. Asked the patient if he would like me to notify the doctor in regards to this issue. Pt stated no and that he was fine. AM nurse aware.

## 2017-06-29 NOTE — DISCHARGE SUMMARY
[facility] Discharge Summary    Mickey Donald MRN# [id number]   Age: 85 year old YOB: 1932     Date of Admission:  6/27/2017  Date of Discharge::  6/30/17  Admitting Physician:  Saqib Seaman MD  Discharge Physician:  Andrea Christensen PA-C             Admission Diagnoses:   Right DJD  Degenerative arthritis of knee          Discharge Diagnosis:   Right DJD  Degenerative arthritis of knee  S/p tka       Procedures:   Procedure(s): Total knee arthoplasty (Right)       No other significant procedures performed during this admission           Medications Prior to Admission:     Prescriptions Prior to Admission   Medication Sig Dispense Refill Last Dose     Clopidogrel Bisulfate (PLAVIX PO) Take 75 mg by mouth daily   6/21/2017     AMLODIPINE BESYLATE PO Take 10 mg by mouth daily   6/27/2017 at Unknown time     ASPIRIN PO Take 81 mg by mouth daily   6/27/2017 at Unknown time     LISINOPRIL PO Take 20 mg by mouth daily   6/26/2017 at Unknown time     ISOSORBIDE DINITRATE PO Take 60 mg by mouth daily    6/27/2017 at Unknown time     PRAVASTATIN SODIUM PO Take 20 mg by mouth every evening    6/26/2017 at Unknown time     [DISCONTINUED] mupirocin (BACTROBAN) 2 % nasal ointment Apply into each nare 2 times daily   6/27/2017 at Unknown time             Discharge Medications:     Current Discharge Medication List      START taking these medications    Details   COMPRESSION STOCKINGS 1 each continuous  Qty: 1 each, Refills: 0    Associated Diagnoses: Status post total right knee replacement      !! aspirin 81 MG EC tablet Take 1 tablet (81 mg) by mouth 2 times daily  Qty: 84 tablet, Refills: 1    Associated Diagnoses: Status post total right knee replacement      oxyCODONE (OXY-IR) 5 MG capsule 2 tabs every 4 hours as needed for pain.  Qty: 60 capsule, Refills: 0    Associated Diagnoses: Status post total right knee replacement      hydrOXYzine (ATARAX) 25 MG tablet Take 1 tablet (25 mg) by mouth  every 6 hours as needed for itching  Qty: 60 tablet, Refills: 1    Associated Diagnoses: Status post total right knee replacement       !! - Potential duplicate medications found. Please discuss with provider.      CONTINUE these medications which have NOT CHANGED    Details   Clopidogrel Bisulfate (PLAVIX PO) Take 75 mg by mouth daily      AMLODIPINE BESYLATE PO Take 10 mg by mouth daily      !! ASPIRIN PO Take 81 mg by mouth daily      LISINOPRIL PO Take 20 mg by mouth daily      ISOSORBIDE DINITRATE PO Take 60 mg by mouth daily       PRAVASTATIN SODIUM PO Take 20 mg by mouth every evening        !! - Potential duplicate medications found. Please discuss with provider.      STOP taking these medications       mupirocin (BACTROBAN) 2 % nasal ointment Comments:   Reason for Stopping:                     Consultations:   Consultation during this admission received from internal medicine.  No medical intervention was indicated.            Brief History of Illness:   This patient was a 85 year old male with a known history of osteoarthritis.  He underwent a period of non-operative treatment which only provided mild and intermittent relief.  After a lengthy discussion and consultation with Dr. Seaman, the patient chose to undergo a both sides knee arthroplasty.  He was explained the risks,complications, and benefits of the procedure and elected to have the surgical procedure.  Patient was cleared by his primary care physician for joint replacement.           Hospital Course:   The patient tolerated the procedure well and was taken to postop recovery in stable condition.  Please refer to the full operative note for complete details.   In recovery, he had a post-operative hemoglobin of   Hemoglobin   Date Value Ref Range Status   06/29/2017 7.7 (L) 13.3 - 17.7 g/dL Final   and was noted to be motor and neurovascular intact.  Post-operative films show components in excellent position.     On post-operative day 1,  patient's wound was checked and noted to be healing well.  The drain output was within normal limits.  Patient had adequate pain control and was prescribed physical therapy.  He was given 24 hrs of perioperative antibiotics.  Patient had motor strength of 5/5 on the both sides.  Patient was neurovascularly intact in the both sides lower extremity. There were no complications throughout the hospital course as the patient passed physical therapy/occupational therapy on post-operative day #3.  The drain was pulled on post-operative day #1.  His discharge hemoglobin was   Hemoglobin   Date Value Ref Range Status   06/29/2017 7.7 (L) 13.3 - 17.7 g/dL Final   and the patient did not require a blood transfusion.  He was followed by the hospitalist during this hospital visit to manage his medical problems.     Upon discharge, the patient was seen by Dr. Seaman/Andrea Christensen PA-C and all questions were answered.  He was discharged on home medications as outlined in medication reconciliation list outlined below.  The patient has instructions that if he has increased pain, fever, erythema, swelling or drainage to immediately call.          Discharge Instructions and Follow-Up:   Discharge diet: Regular   Discharge activity: Activity as tolerated   Discharge follow-up: Follow up with me in 12-14 days   Wound care: Apply bandage daily  Keep wound clean and dry  May get incision wet in shower but do not soak or scrub           Discharge Disposition:   Discharged to short-term care facility      Attestation:  I have reviewed today's vital signs, notes, medications, labs and imaging.    Andrea Christensen PA-C

## 2017-06-29 NOTE — PROGRESS NOTES
"[facility] Post-Op Note    Mickey Donald MRN# [id number]   YOB: 1932 Age: 85 year old              Subjective:  Feels great, no pain.          Physical Exam:  Blood pressure 138/72, pulse 98, temperature 97.5  F (36.4  C), temperature source Oral, resp. rate 16, height 1.753 m (5' 9\"), weight 76.2 kg (168 lb), SpO2 98 %.  Wound clean and dry with minimal or no drainage.  Surrounding skin with minimal erythema.  Dressing dry and intact.    CMS is intact.    Calves non-tender bilaterally.          Data:  CBC:    Recent Labs  Lab 06/29/17  0644 06/28/17  0705   HGB 7.7* 8.6*     INR:  Recent Labs  Lab 06/29/17  0644 06/28/17  0705   INR 1.13 1.17*             Assessment and Plan:   Stable, s/p tka  D/c to tcu tomorrow.       Andrea Christensen PA-C    "

## 2017-06-29 NOTE — PLAN OF CARE
Problem: Goal Outcome Summary  Goal: Goal Outcome Summary  PT:  Discharge Planner PT   Patient plan for discharge: TCU  Current status: pt completing bed mobility and transfers with CGA, WW, continues to need cues for safe mobilization, amb 250 ft w/ WW and CGA, no knee immobilizer but requiring cues to not exceed WBAT  Barriers to return to prior living situation: lives alone, stepsto enter home, steps to basement laundry  Recommendations for discharge: TCU  Rationale for recommendations: lives alone, increased need for assist with mobility       Entered by: Danna Solo 06/29/2017 10:45 AM

## 2017-06-29 NOTE — PLAN OF CARE
Problem: Goal Outcome Summary  Goal: Goal Outcome Summary  Outcome: Improving  Jannette RN  VS monitored, low grade temp, tachycardia (low 100's), 2L NC at hs, Oxycodone for pain, drsg w/small spot dried drainage, CMS+, up w/A1 and ww, claire removed, DTV , TCU upon d/c, will cont to monitor.

## 2017-06-29 NOTE — PLAN OF CARE
Problem: Knee Replacement, Total (Adult)  Goal: Signs and Symptoms of Listed Potential Problems Will be Absent or Manageable (Knee Replacement, Total)  Signs and symptoms of listed potential problems will be absent or manageable by discharge/transition of care (reference Knee Replacement, Total (Adult) CPG).   Outcome: Improving  Pt up with PT and staff, sba, walker and gait belt. Pt tolerted well, up in chair for long periods of time, doing knee excercises.. VSS. Cms intact tor right leg. Pt taking oxycodone 5mg for pain with good relief. Voiding in  Good amts.  Good appetite, pt is pleasant and cooperative .Denies any sob or chest discomfort.

## 2017-06-30 ENCOUNTER — APPOINTMENT (OUTPATIENT)
Dept: PHYSICAL THERAPY | Facility: CLINIC | Age: 82
DRG: 470 | End: 2017-06-30
Attending: ORTHOPAEDIC SURGERY
Payer: MEDICARE

## 2017-06-30 VITALS
WEIGHT: 168 LBS | BODY MASS INDEX: 24.88 KG/M2 | DIASTOLIC BLOOD PRESSURE: 41 MMHG | RESPIRATION RATE: 16 BRPM | SYSTOLIC BLOOD PRESSURE: 135 MMHG | TEMPERATURE: 97.3 F | OXYGEN SATURATION: 98 % | HEIGHT: 69 IN | HEART RATE: 86 BPM

## 2017-06-30 LAB — INR PPP: 1.05 (ref 0.86–1.14)

## 2017-06-30 PROCEDURE — 40000193 ZZH STATISTIC PT WARD VISIT: Performed by: PHYSICAL THERAPIST

## 2017-06-30 PROCEDURE — 85610 PROTHROMBIN TIME: CPT | Performed by: ORTHOPAEDIC SURGERY

## 2017-06-30 PROCEDURE — A9270 NON-COVERED ITEM OR SERVICE: HCPCS | Mod: GY | Performed by: ORTHOPAEDIC SURGERY

## 2017-06-30 PROCEDURE — A9270 NON-COVERED ITEM OR SERVICE: HCPCS | Mod: GY | Performed by: INTERNAL MEDICINE

## 2017-06-30 PROCEDURE — 99232 SBSQ HOSP IP/OBS MODERATE 35: CPT | Performed by: INTERNAL MEDICINE

## 2017-06-30 PROCEDURE — 97110 THERAPEUTIC EXERCISES: CPT | Mod: GP | Performed by: PHYSICAL THERAPIST

## 2017-06-30 PROCEDURE — 36415 COLL VENOUS BLD VENIPUNCTURE: CPT | Performed by: ORTHOPAEDIC SURGERY

## 2017-06-30 PROCEDURE — 25000132 ZZH RX MED GY IP 250 OP 250 PS 637: Mod: GY | Performed by: ORTHOPAEDIC SURGERY

## 2017-06-30 PROCEDURE — 97116 GAIT TRAINING THERAPY: CPT | Mod: GP | Performed by: PHYSICAL THERAPIST

## 2017-06-30 PROCEDURE — 25000132 ZZH RX MED GY IP 250 OP 250 PS 637: Mod: GY | Performed by: INTERNAL MEDICINE

## 2017-06-30 RX ORDER — AMOXICILLIN 250 MG
1 CAPSULE ORAL 2 TIMES DAILY
Qty: 100 TABLET | DISCHARGE
Start: 2017-06-30 | End: 2017-07-07

## 2017-06-30 RX ORDER — POLYETHYLENE GLYCOL 3350 17 G/17G
1 POWDER, FOR SOLUTION ORAL 2 TIMES DAILY PRN
Qty: 510 G | Refills: 1 | DISCHARGE
Start: 2017-06-30

## 2017-06-30 RX ORDER — ASPIRIN 81 MG
100 TABLET, DELAYED RELEASE (ENTERIC COATED) ORAL 2 TIMES DAILY
Qty: 60 TABLET | Refills: 1 | DISCHARGE
Start: 2017-06-30 | End: 2017-07-07

## 2017-06-30 RX ADMIN — ACETAMINOPHEN 975 MG: 325 TABLET, FILM COATED ORAL at 08:19

## 2017-06-30 RX ADMIN — CLOPIDOGREL 75 MG: 75 TABLET, FILM COATED ORAL at 08:20

## 2017-06-30 RX ADMIN — AMLODIPINE BESYLATE 10 MG: 10 TABLET ORAL at 08:20

## 2017-06-30 RX ADMIN — SENNOSIDES AND DOCUSATE SODIUM 2 TABLET: 8.6; 5 TABLET ORAL at 08:20

## 2017-06-30 RX ADMIN — ACETAMINOPHEN 975 MG: 325 TABLET, FILM COATED ORAL at 00:59

## 2017-06-30 RX ADMIN — ASPIRIN 81 MG 81 MG: 81 TABLET ORAL at 08:20

## 2017-06-30 RX ADMIN — ISOSORBIDE DINITRATE 60 MG: 20 TABLET ORAL at 08:20

## 2017-06-30 NOTE — PROGRESS NOTES
Your information has been submitted on June 30th, 2017 at 02:06:33 PM CDT. The confirmation number is [id number]

## 2017-06-30 NOTE — PLAN OF CARE
Problem: Goal Outcome Summary  Goal: Goal Outcome Summary  Outcome: Improving  vss afebrile. Up amb in rodriguez with assist of 1, gaitbelt and walker. Took shower with assist of one. Ate well. Visitors present and supportive. Pain controlled with po meds.

## 2017-06-30 NOTE — PROGRESS NOTES
[facility]  Hospitalist Progress Note  Name: Mickey Donald    MRN: [id number]  Provider:  Karri Dukes MD  06/30/2017    Initial presenting complaint/issue to hospital (Diagnosis): R TKA.         Assessment and Plan:      Summary of Stay: Mickey Donald is a 85 year old male admitted on 6/27/2017 with R TKA. Hx of CAD with 3 stents, moderate AS, PVCs, HTN, dyslipidemia, inguinal hernia repair, closed head injury.  He feels well but hasn't had a BM yet.  Added a bowel regimen to discharge meds.     Problem List:   1. Post op R TKA POD #3-  doing well.  Pain well controlled.  Continue with PT and pain control.  Plans to DC to TCU today.  2. HTN- BP is stable.  Resumed lisinopril, amlodipine, imdur with parameters.   3. CAD-   Resumed ASA, isordil, pravachol and plavix.  I note orthopedic has changed his baby aspirin to 81 mg BID twice daily for dvt prophylaxis in the short term.  No chest pain or dyspnea.  4. AS- f/u as out-pt.  5. Dyslipidemia- resume pravastatin          DVT Prophylaxis: Warfarin  Code Status: Full Code                  Interval History:        Feels well  No chest pain or breathing issues noted  No BM yet but passing gas  Added bowel regimen, discussed options.  He plans to DC to TCU today.                  Physical Exam:      Last Vital Signs:  Temp: 97.3  F (36.3  C) Temp src: Oral BP: 135/41 Pulse: 86 Heart Rate: 61 Resp: 16 SpO2: 98 % O2 Device: None (Room air)      Intake/Output Summary (Last 24 hours) at 06/29/17 1059  Last data filed at 06/29/17 0900   Gross per 24 hour   Intake             1323 ml   Output             1950 ml   Net             -627 ml     I/O last 3 completed shifts:  In: 1483 [P.O.:1480; I.V.:3]  Out: -   Vitals:    06/23/17 1400 06/27/17 1021   Weight: 76.7 kg (169 lb) 76.2 kg (168 lb)       Gen - AAO x 3 in NAD.  Lungs - CTA B with few crackles RLL.  Heart - RR,S1+S2 nml, 4/6 systolic murmur.  Abd - soft, NT, ND, + BS.  Ext - no  edema.         Medications:      All current medications were reviewed.         Data:      All new lab and imaging data was reviewed.   Labs:  No results for input(s): CULT in the last 168 hours.    Recent Labs  Lab 06/29/17 0644 06/28/17  0705   HGB 7.7* 8.6*       Recent Labs  Lab 06/29/17  0644 06/28/17  0705 06/26/17  0715   POTASSIUM  --   --  4.0   * 185*  --    CR  --   --  1.36*   GFRESTIMATED  --   --  50*   GFRESTBLACK  --   --  60*      Recent Imaging:   No results found for this or any previous visit (from the past 24 hour(s)).

## 2017-06-30 NOTE — PLAN OF CARE
Problem: Goal Outcome Summary  Goal: Goal Outcome Summary  PT:  Discharge Planner PT   Patient plan for discharge: TCU  Current status: requiring CGA for bed mobility and transfers with WW, more painful today, pt appears more lethargic and groggy, nsg aware. Ambulated with WW and CGA, no KI R WBAT with step through pattern, good knee control  Barriers to return to prior living situation: lives alone, steps to enter home, steps to basement laundry  Recommendations for discharge: TCU  Rationale for recommendations: lives alone, increased need for assist with mobility       Entered by: Danna Solo 06/30/2017 12:27 PM            Physical Therapy Discharge Summary     Reason for therapy discharge:    Discharged to transitional care facility.     Progress towards therapy goal(s). See goals on Care Plan in [facility] electronic health record for goal details.  Goals partially met.  Barriers to achieving goals:   limited tolerance for therapy and discharge from facility.     Therapy recommendation(s):    Continued therapy is recommended.  Rationale/Recommendations:  address continued functional deficts.

## 2017-06-30 NOTE — PLAN OF CARE
Problem: Goal Outcome Summary  Goal: Goal Outcome Summary  Outcome: Improving  Pt up with 1 assist, gait belt, and walker to bathroom. LS clear, BS active, passing flatus. LBM 6/27/17 - discussed bowel management options with pt - pt understood. CMS intact with no numbness/tingling. Aquacel drsg CDI. Mild edema noted to R knee. Voiding adequately. Plan is to discharge to [facility] today with daughter to transport.

## 2017-06-30 NOTE — PLAN OF CARE
"Problem: Knee Replacement, Total (Adult)  Goal: Signs and Symptoms of Listed Potential Problems Will be Absent or Manageable (Knee Replacement, Total)  Signs and symptoms of listed potential problems will be absent or manageable by discharge/transition of care (reference Knee Replacement, Total (Adult) CPG).   Outcome: Adequate for Discharge Date Met:  06/30/17  Pt  Going to [facility] today. Reviewed orders and copy with pt. Pt up in chair, and amb with PT into halls with walker and gait belt, pt feeling \"tired \" today, and states he feels\" alittle off\". Denies pain, no pain pills today needed, apetitie is good, pt is alert and oriented., cms intact to RLE. VSS.      "

## 2017-06-30 NOTE — PROGRESS NOTES
ransition Communication Hand-off for Care Transitions to Next Level of Care Provider    Name: Mickey Donald  MRN #: [id number]  Primary Care Provider: Young Henley  Primary Care MD Name: Kale  Primary Clinic: [address] [place] [address]     Reason for Hospitalization:  Right DJD  Degenerative arthritis of knee  Admit Date/Time: 6/27/2017 10:12 AM  Discharge Date: 6/30/2017   Payor Source: Payor: BCBS / Plan: BCBS PLATINUM BLUE / Product Type: PPO /     Readmission Assessment Measure (KELSEY) Risk Score/category: LOW     Patient will receive the following: TCU  [facility]     [facility] Rust    AVS/Discharge Summary is the source of truth; this is a helpful guide for improved communication of patient story

## 2018-08-27 ENCOUNTER — RECORDS - HEALTHEAST (OUTPATIENT)
Dept: LAB | Facility: CLINIC | Age: 83
End: 2018-08-27

## 2018-08-29 LAB — BACTERIA SPEC CULT: ABNORMAL

## 2018-09-07 ENCOUNTER — RECORDS - HEALTHEAST (OUTPATIENT)
Dept: LAB | Facility: CLINIC | Age: 83
End: 2018-09-07

## 2018-09-09 LAB — BACTERIA SPEC CULT: ABNORMAL

## 2021-12-01 ENCOUNTER — HOSPITAL ENCOUNTER (OUTPATIENT)
Facility: CLINIC | Age: 86
Setting detail: OBSERVATION
Discharge: HOME | End: 2021-12-02
Attending: EMERGENCY MEDICINE | Admitting: SURGERY
Payer: COMMERCIAL

## 2021-12-01 DIAGNOSIS — K35.30 ACUTE APPENDICITIS WITH LOCALIZED PERITONITIS, WITHOUT PERFORATION, ABSCESS, OR GANGRENE: ICD-10-CM

## 2021-12-01 LAB
ALBUMIN SERPL-MCNC: 2.9 G/DL (ref 3.4–5)
ALP SERPL-CCNC: 59 U/L (ref 40–150)
ALT SERPL W P-5'-P-CCNC: 13 U/L (ref 0–70)
ANION GAP SERPL CALCULATED.3IONS-SCNC: 5 MMOL/L (ref 3–14)
AST SERPL W P-5'-P-CCNC: 12 U/L (ref 0–45)
BILIRUB DIRECT SERPL-MCNC: 0.2 MG/DL (ref 0–0.2)
BILIRUB SERPL-MCNC: 0.5 MG/DL (ref 0.2–1.3)
BUN SERPL-MCNC: 28 MG/DL (ref 7–30)
CALCIUM SERPL-MCNC: 8.9 MG/DL (ref 8.5–10.1)
CHLORIDE BLD-SCNC: 103 MMOL/L (ref 94–109)
CO2 SERPL-SCNC: 26 MMOL/L (ref 20–32)
CREAT SERPL-MCNC: 1.36 MG/DL (ref 0.66–1.25)
ERYTHROCYTE [DISTWIDTH] IN BLOOD BY AUTOMATED COUNT: 12.7 % (ref 10–15)
GFR SERPL CREATININE-BSD FRML MDRD: 46 ML/MIN/1.73M2
GLUCOSE BLD-MCNC: 125 MG/DL (ref 70–99)
HCT VFR BLD AUTO: 35 % (ref 40–53)
HGB BLD-MCNC: 11.2 G/DL (ref 13.3–17.7)
HOLD SPECIMEN: NORMAL
MCH RBC QN AUTO: 30.4 PG (ref 26.5–33)
MCHC RBC AUTO-ENTMCNC: 32 G/DL (ref 31.5–36.5)
MCV RBC AUTO: 95 FL (ref 78–100)
PLATELET # BLD AUTO: 282 10E3/UL (ref 150–450)
POTASSIUM BLD-SCNC: 4.2 MMOL/L (ref 3.4–5.3)
PROT SERPL-MCNC: 7.1 G/DL (ref 6.8–8.8)
RBC # BLD AUTO: 3.69 10E6/UL (ref 4.4–5.9)
SARS-COV-2 RNA RESP QL NAA+PROBE: NEGATIVE
SODIUM SERPL-SCNC: 134 MMOL/L (ref 133–144)
WBC # BLD AUTO: 9.9 10E3/UL (ref 4–11)

## 2021-12-01 PROCEDURE — 85027 COMPLETE CBC AUTOMATED: CPT | Performed by: EMERGENCY MEDICINE

## 2021-12-01 PROCEDURE — 96365 THER/PROPH/DIAG IV INF INIT: CPT

## 2021-12-01 PROCEDURE — 80076 HEPATIC FUNCTION PANEL: CPT | Performed by: INTERNAL MEDICINE

## 2021-12-01 PROCEDURE — C9803 HOPD COVID-19 SPEC COLLECT: HCPCS

## 2021-12-01 PROCEDURE — 99285 EMERGENCY DEPT VISIT HI MDM: CPT | Mod: 25

## 2021-12-01 PROCEDURE — 250N000013 HC RX MED GY IP 250 OP 250 PS 637: Performed by: INTERNAL MEDICINE

## 2021-12-01 PROCEDURE — 99220 PR INITIAL OBSERVATION CARE,LEVEL III: CPT | Performed by: INTERNAL MEDICINE

## 2021-12-01 PROCEDURE — 36415 COLL VENOUS BLD VENIPUNCTURE: CPT | Performed by: INTERNAL MEDICINE

## 2021-12-01 PROCEDURE — 250N000009 HC RX 250: Performed by: EMERGENCY MEDICINE

## 2021-12-01 PROCEDURE — G0378 HOSPITAL OBSERVATION PER HR: HCPCS

## 2021-12-01 PROCEDURE — 36415 COLL VENOUS BLD VENIPUNCTURE: CPT | Performed by: EMERGENCY MEDICINE

## 2021-12-01 PROCEDURE — 87635 SARS-COV-2 COVID-19 AMP PRB: CPT | Performed by: SURGERY

## 2021-12-01 PROCEDURE — 258N000003 HC RX IP 258 OP 636: Performed by: INTERNAL MEDICINE

## 2021-12-01 PROCEDURE — 96375 TX/PRO/DX INJ NEW DRUG ADDON: CPT

## 2021-12-01 PROCEDURE — 80048 BASIC METABOLIC PNL TOTAL CA: CPT | Performed by: EMERGENCY MEDICINE

## 2021-12-01 RX ORDER — NALOXONE HYDROCHLORIDE 0.4 MG/ML
0.4 INJECTION, SOLUTION INTRAMUSCULAR; INTRAVENOUS; SUBCUTANEOUS
Status: DISCONTINUED | OUTPATIENT
Start: 2021-12-01 | End: 2021-12-02 | Stop reason: HOSPADM

## 2021-12-01 RX ORDER — ONDANSETRON 4 MG/1
4 TABLET, ORALLY DISINTEGRATING ORAL EVERY 6 HOURS PRN
Status: DISCONTINUED | OUTPATIENT
Start: 2021-12-01 | End: 2021-12-02 | Stop reason: HOSPADM

## 2021-12-01 RX ORDER — METOPROLOL SUCCINATE 25 MG/1
25 TABLET, EXTENDED RELEASE ORAL 2 TIMES DAILY
COMMUNITY

## 2021-12-01 RX ORDER — CEFOTETAN DISODIUM 1 G/10ML
1 INJECTION, POWDER, FOR SOLUTION INTRAMUSCULAR; INTRAVENOUS ONCE
Status: COMPLETED | OUTPATIENT
Start: 2021-12-01 | End: 2021-12-01

## 2021-12-01 RX ORDER — CEFOTETAN DISODIUM 1 G/10ML
1 INJECTION, POWDER, FOR SOLUTION INTRAMUSCULAR; INTRAVENOUS EVERY 12 HOURS
Status: DISCONTINUED | OUTPATIENT
Start: 2021-12-02 | End: 2021-12-02 | Stop reason: HOSPADM

## 2021-12-01 RX ORDER — DEXTROSE MONOHYDRATE, SODIUM CHLORIDE, AND POTASSIUM CHLORIDE 50; 1.49; 4.5 G/1000ML; G/1000ML; G/1000ML
INJECTION, SOLUTION INTRAVENOUS CONTINUOUS
Status: DISCONTINUED | OUTPATIENT
Start: 2021-12-01 | End: 2021-12-02 | Stop reason: HOSPADM

## 2021-12-01 RX ORDER — HYDROMORPHONE HCL IN WATER/PF 6 MG/30 ML
0.2 PATIENT CONTROLLED ANALGESIA SYRINGE INTRAVENOUS
Status: DISCONTINUED | OUTPATIENT
Start: 2021-12-01 | End: 2021-12-02 | Stop reason: HOSPADM

## 2021-12-01 RX ORDER — ONDANSETRON 2 MG/ML
4 INJECTION INTRAMUSCULAR; INTRAVENOUS EVERY 6 HOURS PRN
Status: DISCONTINUED | OUTPATIENT
Start: 2021-12-01 | End: 2021-12-02 | Stop reason: HOSPADM

## 2021-12-01 RX ORDER — PRAVASTATIN SODIUM 20 MG
20 TABLET ORAL EVERY EVENING
Status: DISCONTINUED | OUTPATIENT
Start: 2021-12-01 | End: 2021-12-02 | Stop reason: HOSPADM

## 2021-12-01 RX ORDER — NALOXONE HYDROCHLORIDE 0.4 MG/ML
0.2 INJECTION, SOLUTION INTRAMUSCULAR; INTRAVENOUS; SUBCUTANEOUS
Status: DISCONTINUED | OUTPATIENT
Start: 2021-12-01 | End: 2021-12-02 | Stop reason: HOSPADM

## 2021-12-01 RX ADMIN — POTASSIUM CHLORIDE, DEXTROSE MONOHYDRATE AND SODIUM CHLORIDE: 150; 5; 450 INJECTION, SOLUTION INTRAVENOUS at 22:23

## 2021-12-01 RX ADMIN — PRAVASTATIN SODIUM 20 MG: 20 TABLET ORAL at 22:32

## 2021-12-01 RX ADMIN — CEFOTETAN DISODIUM 1 G: 1 INJECTION, POWDER, FOR SOLUTION INTRAMUSCULAR; INTRAVENOUS at 18:17

## 2021-12-01 ASSESSMENT — ENCOUNTER SYMPTOMS
BACK PAIN: 1
ABDOMINAL PAIN: 1

## 2021-12-01 ASSESSMENT — MIFFLIN-ST. JEOR: SCORE: 1387.94

## 2021-12-01 NOTE — ED TRIAGE NOTES
Pt comes in from  with a acute appendicitis. Pt was seen at [facility] in [place]. Symptoms were that of abd pain that started last thurs. Pt had chills on Monday. Pt has not taken anything for pain. A&Ox4 pt is vaccinated and booster. Pt had a CT done at [facility] earlier today.

## 2021-12-02 ENCOUNTER — ANESTHESIA (OUTPATIENT)
Dept: SURGERY | Facility: CLINIC | Age: 86
End: 2021-12-02
Payer: COMMERCIAL

## 2021-12-02 ENCOUNTER — ANESTHESIA EVENT (OUTPATIENT)
Dept: SURGERY | Facility: CLINIC | Age: 86
End: 2021-12-02
Payer: COMMERCIAL

## 2021-12-02 VITALS
SYSTOLIC BLOOD PRESSURE: 138 MMHG | TEMPERATURE: 98.5 F | HEART RATE: 79 BPM | OXYGEN SATURATION: 96 % | RESPIRATION RATE: 18 BRPM | BODY MASS INDEX: 23.92 KG/M2 | WEIGHT: 161.5 LBS | HEIGHT: 69 IN | DIASTOLIC BLOOD PRESSURE: 76 MMHG

## 2021-12-02 LAB
ANION GAP SERPL CALCULATED.3IONS-SCNC: 5 MMOL/L (ref 3–14)
BUN SERPL-MCNC: 20 MG/DL (ref 7–30)
CALCIUM SERPL-MCNC: 8.5 MG/DL (ref 8.5–10.1)
CHLORIDE BLD-SCNC: 106 MMOL/L (ref 94–109)
CO2 SERPL-SCNC: 26 MMOL/L (ref 20–32)
CREAT SERPL-MCNC: 1.13 MG/DL (ref 0.66–1.25)
ERYTHROCYTE [DISTWIDTH] IN BLOOD BY AUTOMATED COUNT: 12.6 % (ref 10–15)
GFR SERPL CREATININE-BSD FRML MDRD: 57 ML/MIN/1.73M2
GLUCOSE BLD-MCNC: 125 MG/DL (ref 70–99)
HCT VFR BLD AUTO: 33.5 % (ref 40–53)
HGB BLD-MCNC: 10.7 G/DL (ref 13.3–17.7)
MCH RBC QN AUTO: 29.6 PG (ref 26.5–33)
MCHC RBC AUTO-ENTMCNC: 31.9 G/DL (ref 31.5–36.5)
MCV RBC AUTO: 93 FL (ref 78–100)
PLATELET # BLD AUTO: 252 10E3/UL (ref 150–450)
POTASSIUM BLD-SCNC: 3.9 MMOL/L (ref 3.4–5.3)
RBC # BLD AUTO: 3.61 10E6/UL (ref 4.4–5.9)
SODIUM SERPL-SCNC: 137 MMOL/L (ref 133–144)
WBC # BLD AUTO: 6.9 10E3/UL (ref 4–11)

## 2021-12-02 PROCEDURE — 44950 APPENDECTOMY: CPT | Performed by: SURGERY

## 2021-12-02 PROCEDURE — 370N000017 HC ANESTHESIA TECHNICAL FEE, PER MIN: Performed by: SURGERY

## 2021-12-02 PROCEDURE — 250N000009 HC RX 250: Performed by: SURGERY

## 2021-12-02 PROCEDURE — 250N000013 HC RX MED GY IP 250 OP 250 PS 637: Performed by: SURGERY

## 2021-12-02 PROCEDURE — 80048 BASIC METABOLIC PNL TOTAL CA: CPT | Performed by: INTERNAL MEDICINE

## 2021-12-02 PROCEDURE — 96375 TX/PRO/DX INJ NEW DRUG ADDON: CPT

## 2021-12-02 PROCEDURE — 250N000009 HC RX 250: Performed by: NURSE ANESTHETIST, CERTIFIED REGISTERED

## 2021-12-02 PROCEDURE — 710N000009 HC RECOVERY PHASE 1, LEVEL 1, PER MIN: Performed by: SURGERY

## 2021-12-02 PROCEDURE — 250N000009 HC RX 250: Performed by: INTERNAL MEDICINE

## 2021-12-02 PROCEDURE — 272N000001 HC OR GENERAL SUPPLY STERILE: Performed by: SURGERY

## 2021-12-02 PROCEDURE — 999N000141 HC STATISTIC PRE-PROCEDURE NURSING ASSESSMENT: Performed by: SURGERY

## 2021-12-02 PROCEDURE — 250N000011 HC RX IP 250 OP 636: Performed by: NURSE ANESTHETIST, CERTIFIED REGISTERED

## 2021-12-02 PROCEDURE — 710N000012 HC RECOVERY PHASE 2, PER MINUTE: Performed by: SURGERY

## 2021-12-02 PROCEDURE — 96366 THER/PROPH/DIAG IV INF ADDON: CPT | Mod: 59

## 2021-12-02 PROCEDURE — G0378 HOSPITAL OBSERVATION PER HR: HCPCS

## 2021-12-02 PROCEDURE — 360N000075 HC SURGERY LEVEL 2, PER MIN: Performed by: SURGERY

## 2021-12-02 PROCEDURE — 99217 PR OBSERVATION CARE DISCHARGE: CPT | Performed by: PHYSICIAN ASSISTANT

## 2021-12-02 PROCEDURE — 88304 TISSUE EXAM BY PATHOLOGIST: CPT | Mod: TC | Performed by: SURGERY

## 2021-12-02 PROCEDURE — 250N000013 HC RX MED GY IP 250 OP 250 PS 637: Performed by: PHYSICIAN ASSISTANT

## 2021-12-02 PROCEDURE — 36415 COLL VENOUS BLD VENIPUNCTURE: CPT | Performed by: INTERNAL MEDICINE

## 2021-12-02 PROCEDURE — 258N000003 HC RX IP 258 OP 636: Performed by: NURSE ANESTHETIST, CERTIFIED REGISTERED

## 2021-12-02 PROCEDURE — 250N000011 HC RX IP 250 OP 636: Performed by: SURGERY

## 2021-12-02 PROCEDURE — 250N000011 HC RX IP 250 OP 636: Performed by: ANESTHESIOLOGY

## 2021-12-02 PROCEDURE — 85027 COMPLETE CBC AUTOMATED: CPT | Performed by: INTERNAL MEDICINE

## 2021-12-02 PROCEDURE — 99204 OFFICE O/P NEW MOD 45 MIN: CPT | Mod: 57 | Performed by: SURGERY

## 2021-12-02 RX ORDER — ALBUTEROL SULFATE 0.83 MG/ML
2.5 SOLUTION RESPIRATORY (INHALATION) EVERY 4 HOURS PRN
Status: DISCONTINUED | OUTPATIENT
Start: 2021-12-02 | End: 2021-12-02 | Stop reason: HOSPADM

## 2021-12-02 RX ORDER — DIMENHYDRINATE 50 MG/ML
25 INJECTION, SOLUTION INTRAMUSCULAR; INTRAVENOUS
Status: DISCONTINUED | OUTPATIENT
Start: 2021-12-02 | End: 2021-12-02 | Stop reason: HOSPADM

## 2021-12-02 RX ORDER — FENTANYL CITRATE 50 UG/ML
25 INJECTION, SOLUTION INTRAMUSCULAR; INTRAVENOUS EVERY 5 MIN PRN
Status: DISCONTINUED | OUTPATIENT
Start: 2021-12-02 | End: 2021-12-02 | Stop reason: HOSPADM

## 2021-12-02 RX ORDER — DIPHENHYDRAMINE HYDROCHLORIDE 50 MG/ML
12.5 INJECTION INTRAMUSCULAR; INTRAVENOUS EVERY 6 HOURS PRN
Status: DISCONTINUED | OUTPATIENT
Start: 2021-12-02 | End: 2021-12-02 | Stop reason: HOSPADM

## 2021-12-02 RX ORDER — FENTANYL CITRATE 50 UG/ML
INJECTION, SOLUTION INTRAMUSCULAR; INTRAVENOUS PRN
Status: DISCONTINUED | OUTPATIENT
Start: 2021-12-02 | End: 2021-12-02

## 2021-12-02 RX ORDER — LIDOCAINE 40 MG/G
CREAM TOPICAL
Status: DISCONTINUED | OUTPATIENT
Start: 2021-12-02 | End: 2021-12-02 | Stop reason: HOSPADM

## 2021-12-02 RX ORDER — ONDANSETRON 2 MG/ML
INJECTION INTRAMUSCULAR; INTRAVENOUS PRN
Status: DISCONTINUED | OUTPATIENT
Start: 2021-12-02 | End: 2021-12-02

## 2021-12-02 RX ORDER — HYDROCODONE BITARTRATE AND ACETAMINOPHEN 5; 325 MG/1; MG/1
1 TABLET ORAL
Status: CANCELLED | OUTPATIENT
Start: 2021-12-02

## 2021-12-02 RX ORDER — OXYCODONE HYDROCHLORIDE 5 MG/1
5 TABLET ORAL EVERY 4 HOURS PRN
Status: DISCONTINUED | OUTPATIENT
Start: 2021-12-02 | End: 2021-12-02 | Stop reason: HOSPADM

## 2021-12-02 RX ORDER — SODIUM CHLORIDE, SODIUM LACTATE, POTASSIUM CHLORIDE, CALCIUM CHLORIDE 600; 310; 30; 20 MG/100ML; MG/100ML; MG/100ML; MG/100ML
INJECTION, SOLUTION INTRAVENOUS CONTINUOUS
Status: DISCONTINUED | OUTPATIENT
Start: 2021-12-02 | End: 2021-12-02 | Stop reason: HOSPADM

## 2021-12-02 RX ORDER — HYDRALAZINE HYDROCHLORIDE 20 MG/ML
2.5-5 INJECTION INTRAMUSCULAR; INTRAVENOUS EVERY 10 MIN PRN
Status: DISCONTINUED | OUTPATIENT
Start: 2021-12-02 | End: 2021-12-02 | Stop reason: HOSPADM

## 2021-12-02 RX ORDER — METOPROLOL SUCCINATE 25 MG/1
25 TABLET, EXTENDED RELEASE ORAL 2 TIMES DAILY
Status: DISCONTINUED | OUTPATIENT
Start: 2021-12-02 | End: 2021-12-02 | Stop reason: HOSPADM

## 2021-12-02 RX ORDER — HYDROCODONE BITARTRATE AND ACETAMINOPHEN 5; 325 MG/1; MG/1
1-2 TABLET ORAL EVERY 4 HOURS PRN
Qty: 15 TABLET | Refills: 0 | Status: SHIPPED | OUTPATIENT
Start: 2021-12-02 | End: 2021-12-02

## 2021-12-02 RX ORDER — ACETAMINOPHEN 325 MG/1
975 TABLET ORAL ONCE
Status: DISCONTINUED | OUTPATIENT
Start: 2021-12-02 | End: 2021-12-02 | Stop reason: HOSPADM

## 2021-12-02 RX ORDER — LABETALOL HYDROCHLORIDE 5 MG/ML
10 INJECTION, SOLUTION INTRAVENOUS
Status: DISCONTINUED | OUTPATIENT
Start: 2021-12-02 | End: 2021-12-02 | Stop reason: HOSPADM

## 2021-12-02 RX ORDER — HYDROCODONE BITARTRATE AND ACETAMINOPHEN 5; 325 MG/1; MG/1
2 TABLET ORAL
Status: COMPLETED | OUTPATIENT
Start: 2021-12-02 | End: 2021-12-02

## 2021-12-02 RX ORDER — HYDROCODONE BITARTRATE AND ACETAMINOPHEN 5; 325 MG/1; MG/1
1 TABLET ORAL EVERY 6 HOURS PRN
Qty: 10 TABLET | Refills: 0 | Status: SHIPPED | OUTPATIENT
Start: 2021-12-02 | End: 2021-12-05

## 2021-12-02 RX ORDER — ONDANSETRON 2 MG/ML
4 INJECTION INTRAMUSCULAR; INTRAVENOUS EVERY 30 MIN PRN
Status: DISCONTINUED | OUTPATIENT
Start: 2021-12-02 | End: 2021-12-02 | Stop reason: HOSPADM

## 2021-12-02 RX ORDER — LABETALOL 20 MG/4 ML (5 MG/ML) INTRAVENOUS SYRINGE
PRN
Status: DISCONTINUED | OUTPATIENT
Start: 2021-12-02 | End: 2021-12-02

## 2021-12-02 RX ORDER — GLYCOPYRROLATE 0.2 MG/ML
INJECTION, SOLUTION INTRAMUSCULAR; INTRAVENOUS PRN
Status: DISCONTINUED | OUTPATIENT
Start: 2021-12-02 | End: 2021-12-02

## 2021-12-02 RX ORDER — BUPIVACAINE HYDROCHLORIDE 2.5 MG/ML
INJECTION, SOLUTION INFILTRATION; PERINEURAL PRN
Status: DISCONTINUED | OUTPATIENT
Start: 2021-12-02 | End: 2021-12-02 | Stop reason: HOSPADM

## 2021-12-02 RX ORDER — ONDANSETRON 4 MG/1
4 TABLET, ORALLY DISINTEGRATING ORAL EVERY 30 MIN PRN
Status: DISCONTINUED | OUTPATIENT
Start: 2021-12-02 | End: 2021-12-02 | Stop reason: HOSPADM

## 2021-12-02 RX ORDER — DIPHENHYDRAMINE HCL 12.5MG/5ML
12.5 LIQUID (ML) ORAL EVERY 6 HOURS PRN
Status: DISCONTINUED | OUTPATIENT
Start: 2021-12-02 | End: 2021-12-02 | Stop reason: HOSPADM

## 2021-12-02 RX ORDER — AMOXICILLIN 250 MG
1-2 CAPSULE ORAL 2 TIMES DAILY
Qty: 30 TABLET | Refills: 0 | Status: SHIPPED | OUTPATIENT
Start: 2021-12-02 | End: 2021-12-02

## 2021-12-02 RX ORDER — HALOPERIDOL 5 MG/ML
1 INJECTION INTRAMUSCULAR
Status: DISCONTINUED | OUTPATIENT
Start: 2021-12-02 | End: 2021-12-02 | Stop reason: HOSPADM

## 2021-12-02 RX ORDER — HYDROMORPHONE HCL IN WATER/PF 6 MG/30 ML
0.2 PATIENT CONTROLLED ANALGESIA SYRINGE INTRAVENOUS EVERY 5 MIN PRN
Status: DISCONTINUED | OUTPATIENT
Start: 2021-12-02 | End: 2021-12-02 | Stop reason: HOSPADM

## 2021-12-02 RX ORDER — NEOSTIGMINE METHYLSULFATE 1 MG/ML
VIAL (ML) INJECTION PRN
Status: DISCONTINUED | OUTPATIENT
Start: 2021-12-02 | End: 2021-12-02

## 2021-12-02 RX ORDER — DEXAMETHASONE SODIUM PHOSPHATE 4 MG/ML
INJECTION, SOLUTION INTRA-ARTICULAR; INTRALESIONAL; INTRAMUSCULAR; INTRAVENOUS; SOFT TISSUE PRN
Status: DISCONTINUED | OUTPATIENT
Start: 2021-12-02 | End: 2021-12-02

## 2021-12-02 RX ORDER — ACETAMINOPHEN 325 MG/1
650 TABLET ORAL
Status: CANCELLED | OUTPATIENT
Start: 2021-12-02

## 2021-12-02 RX ORDER — PROPOFOL 10 MG/ML
INJECTION, EMULSION INTRAVENOUS PRN
Status: DISCONTINUED | OUTPATIENT
Start: 2021-12-02 | End: 2021-12-02

## 2021-12-02 RX ORDER — SENNA AND DOCUSATE SODIUM 50; 8.6 MG/1; MG/1
1 TABLET, FILM COATED ORAL AT BEDTIME
Qty: 30 TABLET | Refills: 0 | Status: SHIPPED | OUTPATIENT
Start: 2021-12-02

## 2021-12-02 RX ORDER — SODIUM CHLORIDE, SODIUM LACTATE, POTASSIUM CHLORIDE, CALCIUM CHLORIDE 600; 310; 30; 20 MG/100ML; MG/100ML; MG/100ML; MG/100ML
INJECTION, SOLUTION INTRAVENOUS CONTINUOUS PRN
Status: DISCONTINUED | OUTPATIENT
Start: 2021-12-02 | End: 2021-12-02

## 2021-12-02 RX ORDER — LIDOCAINE HYDROCHLORIDE 10 MG/ML
INJECTION, SOLUTION INFILTRATION; PERINEURAL PRN
Status: DISCONTINUED | OUTPATIENT
Start: 2021-12-02 | End: 2021-12-02

## 2021-12-02 RX ORDER — AMLODIPINE BESYLATE 10 MG/1
10 TABLET ORAL DAILY
Status: DISCONTINUED | OUTPATIENT
Start: 2021-12-02 | End: 2021-12-02 | Stop reason: HOSPADM

## 2021-12-02 RX ORDER — MAGNESIUM HYDROXIDE 1200 MG/15ML
LIQUID ORAL PRN
Status: DISCONTINUED | OUTPATIENT
Start: 2021-12-02 | End: 2021-12-02 | Stop reason: HOSPADM

## 2021-12-02 RX ADMIN — ROCURONIUM BROMIDE 50 MG: 50 INJECTION, SOLUTION INTRAVENOUS at 10:57

## 2021-12-02 RX ADMIN — CEFOTETAN DISODIUM 1 G: 1 INJECTION, POWDER, FOR SOLUTION INTRAMUSCULAR; INTRAVENOUS at 11:04

## 2021-12-02 RX ADMIN — ONDANSETRON HYDROCHLORIDE 4 MG: 2 INJECTION, SOLUTION INTRAVENOUS at 10:57

## 2021-12-02 RX ADMIN — LIDOCAINE HYDROCHLORIDE 50 MG: 10 INJECTION, SOLUTION INFILTRATION; PERINEURAL at 10:57

## 2021-12-02 RX ADMIN — CEFOTETAN DISODIUM 1 G: 1 INJECTION, POWDER, FOR SOLUTION INTRAMUSCULAR; INTRAVENOUS at 06:13

## 2021-12-02 RX ADMIN — NEOSTIGMINE METHYLSULFATE 3.5 MG: 1 INJECTION, SOLUTION INTRAVENOUS at 11:51

## 2021-12-02 RX ADMIN — FENTANYL CITRATE 100 MCG: 50 INJECTION, SOLUTION INTRAMUSCULAR; INTRAVENOUS at 10:57

## 2021-12-02 RX ADMIN — GLYCOPYRROLATE 0.2 MG: 0.2 INJECTION, SOLUTION INTRAMUSCULAR; INTRAVENOUS at 10:57

## 2021-12-02 RX ADMIN — NEOSTIGMINE METHYLSULFATE 1.5 MG: 1 INJECTION, SOLUTION INTRAVENOUS at 11:57

## 2021-12-02 RX ADMIN — LABETALOL 20 MG/4 ML (5 MG/ML) INTRAVENOUS SYRINGE 10 MG: at 11:54

## 2021-12-02 RX ADMIN — GLYCOPYRROLATE 0.2 MG: 0.2 INJECTION, SOLUTION INTRAMUSCULAR; INTRAVENOUS at 11:57

## 2021-12-02 RX ADMIN — HYDRALAZINE HYDROCHLORIDE 5 MG: 20 INJECTION INTRAMUSCULAR; INTRAVENOUS at 12:20

## 2021-12-02 RX ADMIN — HYDROCODONE BITARTRATE AND ACETAMINOPHEN 1 TABLET: 5; 325 TABLET ORAL at 13:58

## 2021-12-02 RX ADMIN — DEXAMETHASONE SODIUM PHOSPHATE 4 MG: 4 INJECTION, SOLUTION INTRA-ARTICULAR; INTRALESIONAL; INTRAMUSCULAR; INTRAVENOUS; SOFT TISSUE at 10:57

## 2021-12-02 RX ADMIN — GLYCOPYRROLATE 0.4 MG: 0.2 INJECTION, SOLUTION INTRAMUSCULAR; INTRAVENOUS at 11:51

## 2021-12-02 RX ADMIN — PROPOFOL 150 MG: 10 INJECTION, EMULSION INTRAVENOUS at 10:57

## 2021-12-02 RX ADMIN — AMLODIPINE BESYLATE 10 MG: 10 TABLET ORAL at 07:52

## 2021-12-02 RX ADMIN — METOPROLOL SUCCINATE 25 MG: 25 TABLET, EXTENDED RELEASE ORAL at 07:52

## 2021-12-02 RX ADMIN — SODIUM CHLORIDE, POTASSIUM CHLORIDE, SODIUM LACTATE AND CALCIUM CHLORIDE: 600; 310; 30; 20 INJECTION, SOLUTION INTRAVENOUS at 10:53

## 2021-12-02 NOTE — ANESTHESIA CARE TRANSFER NOTE
Patient: Mickey Donald    Procedure: Procedure(s):  APPENDECTOMY, OPEN       Diagnosis: Acute appendicitis with localized peritonitis, without perforation, abscess, or gangrene [K35.30]  Diagnosis Additional Information: No value filed.    Anesthesia Type:   General     Note:    Oropharynx: oropharynx clear of all foreign objects  Level of Consciousness: drowsy  Oxygen Supplementation: face mask  Level of Supplemental Oxygen (L/min / FiO2): 6  Independent Airway: airway patency satisfactory and stable  Dentition: dentition unchanged  Vital Signs Stable: post-procedure vital signs reviewed and stable  Report to RN Given: handoff report given  Patient transferred to: PACU    Handoff Report: Identifed the Patient, Identified the Reponsible Provider, Reviewed the pertinent medical history, Discussed the surgical course, Reviewed Intra-OP anesthesia mangement and issues during anesthesia, Set expectations for post-procedure period and Allowed opportunity for questions and acknowledgement of understanding      Vitals:  Vitals Value Taken Time   /80 12/02/21 1201   Temp     Pulse 75 12/02/21 1204   Resp 19 12/02/21 1204   SpO2 100 % 12/02/21 1204   Vitals shown include unvalidated device data.    Electronically Signed By: JAVI Bhakta CRNA  December 2, 2021  12:05 PM

## 2021-12-02 NOTE — CONSULTS
[facility]  [facility] - H&P     Mickey Donald MRN# [id number]   Age: 89 year old YOB: 1932     HPI:  Patient has been experiencing acute RLQ abdominal pain for the past 8 days associated with chills and anorexia.  These symptoms have persisted without resolution. He was found to have appendicitis on evaluation yesterday.  Unable to have appendectomy last evening due to staffing issues.  He is on Plavix for his heart.  He took his last dose yesterday.      History is obtained from the patient    Review Of Systems:  Respiratory: No shortness of breath, dyspnea on exertion, cough, or hemoptysis  Cardiovascular: negative  Gastrointestinal: as above  Genitourinary: negative  All remaining review of systems negative except as stated in HPI.    PMH:  Past Medical History:   Diagnosis Date     Coronary artery disease      Hypertension      Osteoarthritis     knees     Stented coronary artery            PSH:  Past Surgical History:   Procedure Laterality Date     ARTHROPLASTY KNEE Right 2017    Procedure: ARTHROPLASTY KNEE;  Right total knee arthroplasty;  Surgeon: Saqib Seaman MD;  Location: [facility] OR     CARDIAC SURGERY      stents cardiac      EYE SURGERY      cataract surg right eye     HERNIORRHAPHY INGUINAL  2013    Procedure: HERNIORRHAPHY INGUINAL;  Right Inguinal Hernia Repair with Mesh;  Surgeon: Young Kemp MD;  Location: [facility] OR       Allergies:  No Known Allergies    Home Medications:  No current outpatient medications on file.       Social History:  Social History     Tobacco Use     Smoking status: Former Smoker     Quit date: 1968     Years since quittin.9     Smokeless tobacco: Not on file     Tobacco comment: quit    Substance Use Topics     Alcohol use: No     Drug use: No       Family History:  No family history on file.   No family history chronic diarrhea, inflammatory bowel disease or colon cancer.    Physical Exam:  BP (!)  "152/84 (BP Location: Left arm)   Pulse 81   Temp 98.4  F (36.9  C) (Oral)   Resp 16   Ht 1.753 m (5' 9\")   Wt 73.3 kg (161 lb 8 oz)   SpO2 97%   BMI 23.85 kg/m      General appearance: healthy, alert and mild distress.  Hydration: well hydrated  HEENT: normocephalic, atraumatic  Neck: no adenopathy  Lungs: normal and clear to auscultation  Heart: regular rate and rhythm and no murmurs, clicks, or gallops  Abdomen: flat and symmetric, normal bowel sounds. Tenderness: present: RLQ moderate and involuntary guarding.  Masses: none.  Organomegaly: none  Skin: clear without rashes/lesions  Extremities: no gross deformities  Neuro: oriented to time & place, moves all extremities with normal strength, speech clear    Labs Reviewed:  Lab Results   Component Value Date    WBC 6.9 12/02/2021     Lab Results   Component Value Date    HGB 10.7 12/02/2021    HGB 7.7 06/29/2017     Lab Results   Component Value Date     12/02/2021       Last Basic Metabolic Panel:  Lab Results   Component Value Date     12/02/2021      Lab Results   Component Value Date    POTASSIUM 3.9 12/02/2021    POTASSIUM 4.0 06/26/2017     Lab Results   Component Value Date    CHLORIDE 106 12/02/2021     Lab Results   Component Value Date    KEYONNA 8.5 12/02/2021     Lab Results   Component Value Date    CO2 26 12/02/2021     Lab Results   Component Value Date    BUN 20 12/02/2021     Lab Results   Component Value Date    CR 1.13 12/02/2021    CR 1.36 06/26/2017     Lab Results   Component Value Date     12/02/2021     06/29/2017         Radiology:  CT abdomen reveals a dilated, thick-walled appendix with surrounding fat stranding.  All imaging studies reviewed by me.    ASSESSMENT/PLAN:  The patient's history, physical exam, laboratory and imaging studies are suspicious for acute appendicitis.  I have offered the patient appendectomy.  The risks, benefits, and alternatives have been discussed in detail.  All of the patient's " questions have been answered.  They elect to proceed and we will go to the OR at the soonest availability.  Pre-operative antibiotics have been ordered.   He will likely be discharged if his appendix is non-perforated.    Libertad Kyle MD

## 2021-12-02 NOTE — ED NOTES
.  [facility]  ED Nurse Handoff Report    Mickey Donald is a 89 year old male   ED Chief complaint: Abdominal Pain  . ED Diagnosis:   Final diagnoses:   Acute appendicitis with localized peritonitis, without perforation, abscess, or gangrene     Allergies: No Known Allergies    Code Status: Full Code  Activity level - Baseline/Home:  Independent. Activity Level - Current:   Stand by Assist. Lift room needed: No. Bariatric: No   Needed: No   Isolation: No. Infection: Not Applicable.     Vital Signs:   Vitals:    12/01/21 1830 12/01/21 1845 12/01/21 1900 12/01/21 1930   BP: (!) 161/76  (!) 161/89 (!) 155/77   Pulse: 90  96 90   Resp:       Temp:       TempSrc:       SpO2: 98% 97% 98% 96%   Weight:           Cardiac Rhythm:  ,      Pain level:    Patient confused: No. Patient Falls Risk: Yes.   Elimination Status: Has voided   Patient Report - Initial Complaint: is a 89 year old male on Plavix and aspirin with history of hypertension and inguinal hernia who presents with abdominal pain. Per his family member, pain began on Thursday. He went to the doctor earlier today where a CT scan was performed. Results showed an infected appendix. See results below.  Also, he reports bilateral back pain. He ate one piece of toast this morning, although he has not eaten much due to the pain. He has a past medical history of hernias and mentions previous hernia repair surgery..   Focused Assessment:   Gastrointestinal Gastrointestinal - Gastrointestinal WDL: .WDL except; all  Nausea/Vomiting Signs/Symptoms:  (denies) GI Signs/Symptoms: abdominal discomfort (RLQ abd, rating 1/10 at this time)        Tests Performed: n. Abnormal Results: .  Labs Ordered and Resulted from Time of ED Arrival to Time of ED Departure   CBC WITH PLATELETS - Abnormal       Result Value    WBC Count 9.9      RBC Count 3.69 (*)     Hemoglobin 11.2 (*)     Hematocrit 35.0 (*)     MCV 95      MCH 30.4      MCHC 32.0      RDW 12.7       Platelet Count 282     BASIC METABOLIC PANEL - Abnormal    Sodium 134      Potassium 4.2      Chloride 103      Carbon Dioxide (CO2) 26      Anion Gap 5      Urea Nitrogen 28      Creatinine 1.36 (*)     Calcium 8.9      Glucose 125 (*)     GFR Estimate 46 (*)    COVID-19 VIRUS (CORONAVIRUS) BY PCR     .  No orders to display   Imaging results:   CT ABDOMEN PELVIS W  IMPRESSION:   1.  Uncomplicated acute appendicitis. Dr. Agosto paged at 1:13 PM 12/01/2021.  2.  Cholelithiasis.     Treatments provided: see ED meds below  Family Comments: NA  OBS brochure/video discussed/provided to patient:  Yes  ED Medications:   Medications   cefoTEtan (CEFOTAN) 1 g vial to attach to  ml bag (0 g Intravenous Stopped 12/1/21 1901)     Drips infusing:  No  For the majority of the shift, the patient's behavior Green. Interventions performed were NA.    Sepsis treatment initiated: No     Patient tested for COVID 19 prior to admission: YES    ED Nurse Name/Phone Number: Chiara Donald RN,   7:35 PM    RECEIVING UNIT ED HANDOFF REVIEW    Above ED Nurse Handoff Report was reviewed: Yes  Reviewed by: Scott Henderson RN on December 1, 2021 at 9:27 PM

## 2021-12-02 NOTE — DISCHARGE SUMMARY
[facility]  Hospitalist Discharge Summary      Date of Admission:  12/1/2021  Date of Discharge:  12/2/2021  Discharging Provider: Debbie Cano PA-C      Discharge Diagnoses   Acute appendicitis     Follow-ups Needed After Discharge   Follow-up Appointments     Follow-up and recommended labs and tests       Follow up with primary care provider, Nathaly Doyle, within 7 days for   hospital follow- up.  No follow up labs or test are needed.  Resume home Aspirin, Plavix and Lisinopril tomorrow, 12/3, unless   otherwise instructed by the surgeon.             Unresulted Labs Ordered in the Past 30 Days of this Admission     No orders found for last 31 day(s).      These results will be followed up by PCP    Discharge Disposition   Discharged plan to home following surgery  Condition at discharge: Stable      Hospital Course   Mickey Donald is a 89 year old male with a PMHx significant for CAD, HTN, and CKD, who was admitted on 12/1/2021 with acute appendicitis. He was admitted to OBS, started on IV abx and general surgery was consulted. His labs were stable and his vital signs remained stable. His symptoms were well controlled. Surgery did recommend lap appendectomy. The patient was taken to the OR with plans to recover in PACU and discharge home pending any complications. Pt understands the plan. He will follow up with surgery as directed and PCP in 1-2 weeks. He can resume his home ASA, Plavix and Lisinopril tomorrow, unless otherwise directed by surgery.      Consultations This Hospital Stay   SURGERY GENERAL IP CONSULT    Code Status   Full Code    Time Spent on this Encounter   I, Debbie Cano PA-C, personally saw the patient today and spent less than or equal to 30 minutes discharging this patient.       [facility] OBSERVATION DEPT  [address] [facility] [address]  Phone:  [phone]  ______________________________________________________________________    Physical Exam   Vital Signs: Temp: 98.4  F (36.9  C) Temp src: Oral BP: (!) 152/84 Pulse: 81   Resp: 16 SpO2: 97 % O2 Device: None (Room air)    Weight: 161 lbs 8 oz    GENERAL:  Comfortable.  PSYCH: pleasant, oriented, No acute distress.  HEART:  Regularly irregular, otherwise Normal S1, S2 with no murmur, no pericardial rub, gallops or S3 or S4.  LUNGS:  Clear to auscultation, normal Respiratory effort. No wheezing, rales or ronchi.  GI:  Soft, normal bowel sounds. RLQ abd tenderness, non distended.   EXTREMITIES:  Able to move all extremities independently   SKIN:  Dry to touch, No rash, wound or ulcerations.  NEUROLOGIC:  Grossly intact       Primary Care Physician   Nathaly Doyle    Discharge Orders      Reason for your hospital stay    Acute appendicitis. General surgery was consulted and took you to the OR for lap appendectomy with plan to recover in PACU and discharge home. You had minimal symptoms and felt well prior to surgery.     Follow-up and recommended labs and tests     Follow up with primary care provider, Nathaly Doyle, within 7 days for hospital follow- up.  No follow up labs or test are needed.  Resume home Aspirin, Plavix and Lisinopril tomorrow, 12/3, unless otherwise instructed by the surgeon.     Activity    Your activity upon discharge: activity as tolerated. Lifting restrictions per surgery     When to contact your care team    Call your primary doctor if you have any of the following: temperature greater than 101, shortness of breath, increased pain, or intractable nausea and/or vomiting.     Diet    Follow this diet upon discharge: Advance to a regular diet as tolerated       Significant Results and Procedures   Most Recent 3 CBC's:Recent Labs   Lab Test 12/02/21  0516 12/01/21  1630 06/29/17  0644   WBC 6.9 9.9  --    HGB 10.7* 11.2* 7.7*   MCV 93 95  --     282  --      Most Recent 3  BMP's:Recent Labs   Lab Test 12/02/21  0516 12/01/21  1630 06/29/17  0644 06/28/17  0705 06/26/17  0715    134  --   --   --    POTASSIUM 3.9 4.2  --   --  4.0   CHLORIDE 106 103  --   --   --    CO2 26 26  --   --   --    BUN 20 28  --   --   --    CR 1.13 1.36*  --   --  1.36*   ANIONGAP 5 5  --   --   --    KEYONNA 8.5 8.9  --   --   --    * 125* 111*   < >  --     < > = values in this interval not displayed.     Most Recent 2 LFT's:Recent Labs   Lab Test 12/01/21  2202   AST 12   ALT 13   ALKPHOS 59   BILITOTAL 0.5   ,   Results for orders placed or performed during the hospital encounter of 06/27/17   XR Knee Port Right 1/2 Views    Narrative    XR KNEE PORT RT 1/2 VW 6/27/2017 1:47 PM    HISTORY: Post-Op Total Knee    COMPARISON: None.    FINDINGS: Right knee arthroplasty hardware components are well-seated  without radiographic evidence of complication.      Impression    IMPRESSION: Right knee arthroplasty.    MASSIMO TORRES MD       Discharge Medications   Current Discharge Medication List      CONTINUE these medications which have NOT CHANGED    Details   AMLODIPINE BESYLATE PO Take 10 mg by mouth daily      ASPIRIN PO Take 81 mg by mouth daily      Clopidogrel Bisulfate (PLAVIX PO) Take 75 mg by mouth daily      LISINOPRIL PO Take 20 mg by mouth daily      metoprolol succinate ER (TOPROL-XL) 25 MG 24 hr tablet Take 25 mg by mouth 2 times daily      polyethylene glycol (MIRALAX) powder Take 17 g (1 capful) by mouth 2 times daily as needed for constipation  Qty: 510 g, Refills: 1    Associated Diagnoses: Status post total right knee replacement      PRAVASTATIN SODIUM PO Take 20 mg by mouth every evening       COMPRESSION STOCKINGS 1 each continuous  Qty: 1 each, Refills: 0    Associated Diagnoses: Status post total right knee replacement           Allergies   No Known Allergies

## 2021-12-02 NOTE — ANESTHESIA PREPROCEDURE EVALUATION
Anesthesia Pre-Procedure Evaluation    Patient: Mickey Donald   MRN: [id number] : 1932        Preoperative Diagnosis: Acute appendicitis with localized peritonitis, without perforation, abscess, or gangrene [K35.30]    Procedure : Procedure(s):  APPENDECTOMY, OPEN          Past Medical History:   Diagnosis Date     Coronary artery disease      Hypertension      Osteoarthritis     knees     Stented coronary artery           Past Surgical History:   Procedure Laterality Date     ARTHROPLASTY KNEE Right 2017    Procedure: ARTHROPLASTY KNEE;  Right total knee arthroplasty;  Surgeon: Saqib Seaman MD;  Location: [facility] OR     CARDIAC SURGERY      stents cardiac      EYE SURGERY      cataract surg right eye     HERNIORRHAPHY INGUINAL  2013    Procedure: HERNIORRHAPHY INGUINAL;  Right Inguinal Hernia Repair with Mesh;  Surgeon: Young Kemp MD;  Location: [facility] OR      No Known Allergies   Social History     Tobacco Use     Smoking status: Former Smoker     Quit date: 1968     Years since quittin.9     Smokeless tobacco: Not on file     Tobacco comment: quit    Substance Use Topics     Alcohol use: No      Wt Readings from Last 1 Encounters:   21 73.3 kg (161 lb 8 oz)        Anesthesia Evaluation   Pt has had prior anesthetic. Type: General.    No history of anesthetic complications       ROS/MED HX  ENT/Pulmonary:  - neg pulmonary ROS     Neurologic:  - neg neurologic ROS     Cardiovascular:     (+) hypertension--CAD -past MI --Taking blood thinners Pt has received instructions: Instructions Given to patient: stopped. valvular problems/murmurs type: AS S/P AVR.     METS/Exercise Tolerance:     Hematologic:  - neg hematologic  ROS     Musculoskeletal:   (+) arthritis,     GI/Hepatic:     (+) appendicitis,     Renal/Genitourinary:     (+) renal disease, type: CRI, Pt does not require dialysis,     Endo:  - neg endo ROS     Psychiatric/Substance Use:  - neg psychiatric ROS      Infectious Disease:  - neg infectious disease ROS     Malignancy:  - neg malignancy ROS     Other:  - neg other ROS          Physical Exam    Airway        Mallampati: II   TM distance: > 3 FB   Neck ROM: full   Mouth opening: > 3 cm    Respiratory Devices and Support         Dental  no notable dental history         Cardiovascular   cardiovascular exam normal       Rhythm and rate: regular and normal     Pulmonary   pulmonary exam normal        breath sounds clear to auscultation       Other findings: Lab Test        12/02/21 12/01/21 06/30/17 06/29/17 06/28/17                       0516          1630          0638          0644          0705          WBC          6.9          9.9           --           --           --           HGB          10.7*        11.2*         --          7.7*         8.6*          MCV          93           95            --           --           --           PLT          252          282           --           --           --           INR           --           --          1.05         1.13         1.17*          Lab Test        12/02/21 12/01/21 06/29/17 06/28/17 06/26/17                       0516          1630          0644          0705          0715          NA           137          134           --           --           --           POTASSIUM    3.9          4.2           --           --          4.0           CHLORIDE     106          103           --           --           --           CO2          26           26            --           --           --           BUN          20           28            --           --           --           CR           1.13         1.36*         --           --          1.36*         ANIONGAP     5            5             --           --           --           KEYONNA          8.5          8.9           --           --           --           GLC          125*         125*         111*           < >         --             < > = values in this interval not displayed.                        EKG Interpretation:   Sinus rhythm with 1st degree A-V block with frequent Premature ventricular complexes Incomplete left bundle branch block Borderline EC    OUTSIDE LABS:  CBC:   Lab Results   Component Value Date    WBC 6.9 12/02/2021    WBC 9.9 12/01/2021    HGB 10.7 (L) 12/02/2021    HGB 11.2 (L) 12/01/2021    HCT 33.5 (L) 12/02/2021    HCT 35.0 (L) 12/01/2021     12/02/2021     12/01/2021     BMP:   Lab Results   Component Value Date     12/02/2021     12/01/2021    POTASSIUM 3.9 12/02/2021    POTASSIUM 4.2 12/01/2021    CHLORIDE 106 12/02/2021    CHLORIDE 103 12/01/2021    CO2 26 12/02/2021    CO2 26 12/01/2021    BUN 20 12/02/2021    BUN 28 12/01/2021    CR 1.13 12/02/2021    CR 1.36 (H) 12/01/2021     (H) 12/02/2021     (H) 12/01/2021     COAGS:   Lab Results   Component Value Date    INR 1.05 06/30/2017     POC: No results found for: BGM, HCG, HCGS  HEPATIC:   Lab Results   Component Value Date    ALBUMIN 2.9 (L) 12/01/2021    PROTTOTAL 7.1 12/01/2021    ALT 13 12/01/2021    AST 12 12/01/2021    ALKPHOS 59 12/01/2021    BILITOTAL 0.5 12/01/2021     OTHER:   Lab Results   Component Value Date    A1C 5.6 06/28/2017    KEYONNA 8.5 12/02/2021       Anesthesia Plan    ASA Status:  3      Anesthesia Type: General.     - Airway: ETT   Induction: Intravenous.   Maintenance: Balanced.        Consents    Anesthesia Plan(s) and associated risks, benefits, and realistic alternatives discussed. Questions answered and patient/representative(s) expressed understanding.     - Discussed: Risks, Benefits and Alternatives for BOTH SEDATION and the PROCEDURE were discussed     - Discussed with:  Patient      - Extended Intubation/Ventilatory Support Discussed: No.      - Patient is DNR/DNI Status: No    Use of blood products discussed: No .     Postoperative Care    Pain management: IV analgesics, Oral pain  medications, Peripheral nerve block (Single Shot), Multi-modal analgesia.   PONV prophylaxis: Ondansetron (or other 5HT-3), Dexamethasone or Solumedrol     Comments:                Saqib Casanova MD

## 2021-12-02 NOTE — PROGRESS NOTES
ROOM # 201    Living Situation (if not independent, order SW consult): Home alone  Facility name:  : Daughter Beatrice Murillo    Activity level at baseline: Ind  Activity level on admit: SBA      Patient registered to observation; given Patient Bill of Rights; given the opportunity to ask questions about observation status and their plan of care.  Patient has been oriented to the observation room, bathroom and call light is in place.    Discussed discharge goals and expectations with patient/family.

## 2021-12-02 NOTE — PROVIDER NOTIFICATION
10:56 PM  Provider: BENJA  Message: ANDREW: EKG showed SR 90s w/ 1st degree AVB and premature PVCs w. incomplete LBBB, BP is elevated 168/102 & 153/91. Denies chest pain.  Response:

## 2021-12-02 NOTE — DISCHARGE INSTRUCTIONS
HOME CARE FOLLOWING APPENDECTOMY  GLEN Smith, YUSUF Beavers R. O Donnell, J. Shaheen    Special instructions for Mickey ARACELI Donald:  --hold Plavix until Sunday.  -Augmentin for 1 week. Also Senna and Norco ordered.  Metamucil OTC, BID while on pain meds.         INCISIONAL CARE:  Replace the bandage over your incision(s) until all drainage stops, or if more comfortable to have in place.  If present, leave the steri-strips (white paper tapes) in place for 14 days after surgery.  If Dermabond (a type of skin glue) is present, leave in place until it wears/flakes off (2-3 weeks).     BATHING:  OK to shower 48 hours after surgery.  Avoid baths for 1 week after surgery.  You may wash your hair at any time.  Gently pat your incision dry after bathing.  Do not apply lotions, creams, or ointments to incisions.    ACTIVITY:  Light Activity -- you may immediately be up and about as tolerated.  Walking is encouraged, increase as tolerated.  Driving/Light Work-- when comfortable and off narcotic pain medications.  Strenuous Work/Activity -- limit lifting to 20 pounds for 2 weeks.  Progressively increase with time.  Active Sports (running, biking, etc.) -- cautiously resume after 2 weeks.    DISCOMFORT:  Local anesthetic placed at surgery should provide relief for 4-8 hours.  Begin taking pain pills before discomfort is severe.  Take the pain medication with some food, when possible, to minimize side effects.  Intermittent use of ice packs may help during the first 1-3 weeks after surgery.  Expect gradual improvement.    Over-the-counter anti-inflammatory medications (i.e. Ibuprofen/Advil/Motrin or Naprosyn/Aleve) may be used per package instructions in addition to or while tapering off the narcotic pain medications to decrease swelling and sensitivity.  DO NOT TAKE these Anti-inflammatory medications if your primary physician has advised against doing so, or if you have acid reflux, ulcer, or  "bleeding disorder, or take blood-thinner medications.  Call your primary physician or the surgery office if you have medication questions.  You may have decreased energy level for 1-2 weeks after surgery related to your recovery.    DIET:  Start with liquids and gradually resume your regular diet as tolerated.  Consider eating yogurt or taking a probiotic to help your \"gut yassine\" (good bacteria in the bowel/colon) return to normal while taking or after receiving antibiotics.  Drink plenty of fluids.  While taking pain medications, consider use of a stool softener, increase your fiber in your diet, or add a fiber supplement (like Metamucil, Citrucel) to help prevent constipation - a possible side effect of pain medications.    NAUSEA:  If nauseated from the anesthetic/pain meds; rest in bed, get up cautiously with assistance, and drink clear liquids (juice, tea, broth).    FOLLOW-UP AFTER SURGERY:  -Our office will contact you approximately 2-3 weeks after surgery to check on your progress and answer any questions you may have.  If you are doing well, you will not need to return for an office appointment.  If any concerns are identified over the phone, we will help you make an appointment to see a provider.    -If you have not received a phone call, have any questions or concerns, or would like to be seen, please call us at [phone].  We are located at: [address] [facility], [address]; [place] [address]    -CONTACT US IF THE FOLLOWING DEVELOPS:   1. A fever that is above 101     2. Increased redness, warmth, drainage, bleeding, or swelling.   3. Pain that is not relieved by rest/ice and your prescription.   4.  Increasing pain after 48 hours.   5. Drainage that is thick, cloudy, yellow, green or white.   6. Any other questions or concerns.      FREQUENTLY ASKED QUESTIONS:    Q:  How should my incision look?    A:  Normally your incision will appear slightly swollen with light redness directly along the incision " itself as it heals.  It may feel like a bump or ridge as the healing/scarring happens, and over time (3-4 months) this bump or ridge feeling should slowly go away.  In general, clear or pink watery drainage can be normal at first as your incision heals, but should decrease over time.    Q:  How do I know if my incision is infected?  A:  Look at your incision for signs of infection, like redness around the incision spreading to surrounding skin, or drainage of cloudy or foul-smelling drainage.  If you feel warm, check your temperature to see if you are running a fever.    **If any of these things occur, please notify the nurse at our office.  We may need you to come into the office for an incision check.      Q:  How do I take care of my incision?  A:  If you have a dressing in place - Starting the day after surgery, replace the dressing 1-2 times a day until there is no further drainage from the incision.  At that time, a dressing is no longer needed.  Try to minimize tape on the skin if irritation is occurring at the tape sites.  If you have significant irritation from tape on the skin, please call the office to discuss other method of dressing your incision.    Small pieces of tape called  steri-strips  may be present directly overlying your incision; these may be removed 10 days after surgery unless otherwise specified by your surgeon.  If these tapes start to loosen at the ends, you may trim them back until they fall off or are removed.    A:  If you had  Dermabond  tissue glue used as a dressing (this causes your incision to look shiny with a clear covering over it) - This type of dressing wears off with time and does not require more dressings over the top unless it is draining around the glue as it wears off.  Do not apply ointments or lotions over the incisions until the glue has completely worn off.    Q:  There is a piece of tape or a sticky  lead  still on my skin.  Can I remove this?  A:  Sometimes the  sticky  leads  used for monitoring during surgery or for evaluation in the emergency department are not all removed while you are in the hospital.  These sometimes have a tab or metal dot on them.  You can easily remove these on your own, like taking off a band-aid.  If there is a gel substance under the  lead , simply wipe/clean it off with a washcloth or paper towel.      Q:  What can I do to minimize constipation (very hard stools, or lack of stools)?  A:  Stay well hydrated.  Increase your dietary fiber intake or take a fiber supplement -with plenty of water.  Walk around frequently.  You may consider an over-the-counter stool-softener.  Your Pharmacist can assist you with choosing one that is stocked at your pharmacy.  Constipation is also one of the most common side effects of pain medication.  If you are using pain medication, be pro-active and try to PREVENT problems with constipation by taking the steps above BEFORE constipation becomes a problem.    Q:  What do I do if I need more pain medications?  A:  Call the office to receive refills.  Be aware that certain pain meds cannot be called into a pharmacy and actually require a paper prescription.  A change may be made in your pain med as you progress thru your recovery period or if you have side effects to certain meds.    --Pain meds are NOT refilled after 5pm on weekdays, and NOT AT ALL on the weekends, so please look ahead to prevent problems.    Q:  Why am I having a hard time sleeping now that I am at home?  A:  Many medications you receive while you are in the hospital can impact your sleep for a number of days after your surgery/hospitalization.  Decreased level of activity and naps during the day may also make sleeping at night difficult.  Try to minimize day-time naps, and get up frequently during the day to walk around your home during your recovery time.  Sleep aides may be of some help, but are not recommended for long-term use.      Q:  I am  having some back discomfort.  What should I do?  A:  This may be related to certain positioning that was required for your surgery, extended periods of time in bed, or other changes in your overall activity level.  You may try ice, heat, acetaminophen, or ibuprofen to treat this temporarily.  Note that many pain medications have acetaminophen in them and would state this on the prescription bottle.  Be sure not to exceed the maximum of 4000mg per day of acetaminophen.     **If the pain you are having does not resolve, is severe, or is a flare of back pain you have had on other occasions prior to surgery, please contact your primary physician for further recommendations or for an appointment to be examined at their office.    Q:  Why am I having headaches?  A:  Headaches can be caused by many things:  caffeine withdrawal, use of pain meds, dehydration, high blood pressure, lack of sleep, over-activity/exhaustion, flare-up of usual migraine headaches.  If you feel this is related to muscle tension (a band-like feeling around the head, or a pressure at the low-back of the head) you may try ice or heat to this area.  You may need to drink more fluids (try electrolyte drink like Gatorade), rest, or take your usual migraine medications.   **If your headaches do not resolve, worsen, are accompanied by other symptoms, or if your blood pressure is high, please call your primary physician for recommendation and/or examination.    Q:  I am unable to urinate.  What do I do?  A:  A small percentage of people can have difficulty urinating initially after surgery.  This includes being able to urinate only a very small amount at a time and feeling discomfort or pressure in the very low abdomen.  This is called  urinary retention , and is actually an urgent situation.  Proceed to your nearest Emergency department for evaluation (not an Urgent Care Center).  Sometimes the bladder does not work correctly after certain medications you  receive during surgery, or related to certain procedures.  You may need to have a catheter placed until your bladder recovers.  When planning to go to an Emergency department, it may help to call the ER to let them know you are coming in for this problem after a surgery.  This may help you get in quicker to be evaluated.  **If you have symptoms of a urinary tract infection, please contact your primary physician for the proper evaluation and treatment.        If you have other questions, please call the office Monday thru Friday between 8am and 5pm to discuss with the Nurse or Physician Assistant.  #[phone]    There is a surgeon ON CALL on weekday evenings and over the weekend in case of urgent need only, and may be contacted at the same number.    If you are having an emergency, call 911 or proceed to your nearest emergency department.j    GENERAL ANESTHESIA OR SEDATION ADULT DISCHARGE INSTRUCTIONS   SPECIAL PRECAUTIONS FOR 24 HOURS AFTER SURGERY    IT IS NOT UNUSUAL TO FEEL LIGHT-HEADED OR FAINT, UP TO 24 HOURS AFTER SURGERY OR WHILE TAKING PAIN MEDICATION.  IF YOU HAVE THESE SYMPTOMS; SIT FOR A FEW MINUTES BEFORE STANDING AND HAVE SOMEONE ASSIST YOU WHEN YOU GET UP TO WALK OR USE THE BATHROOM.    YOU SHOULD REST AND RELAX FOR THE NEXT 24 HOURS AND YOU MUST MAKE ARRANGEMENTS TO HAVE SOMEONE STAY WITH YOU FOR AT LEAST 24 HOURS AFTER YOUR DISCHARGE.  AVOID HAZARDOUS AND STRENUOUS ACTIVITIES.  DO NOT MAKE IMPORTANT DECISIONS FOR 24 HOURS.    DO NOT DRIVE ANY VEHICLE OR OPERATE MECHANICAL EQUIPMENT FOR 24 HOURS FOLLOWING THE END OF YOUR SURGERY.  EVEN THOUGH YOU MAY FEEL NORMAL, YOUR REACTIONS MAY BE AFFECTED BY THE MEDICATION YOU HAVE RECEIVED.    DO NOT DRINK ALCOHOLIC BEVERAGES FOR 24 HOURS FOLLOWING YOUR SURGERY.    DRINK CLEAR LIQUIDS (APPLE JUICE, GINGER ALE, 7-UP, BROTH, ETC.).  PROGRESS TO YOUR REGULAR DIET AS YOU FEEL ABLE.    YOU MAY HAVE A DRY MOUTH, A SORE THROAT, MUSCLES ACHES OR TROUBLE SLEEPING.   THESE SHOULD GO AWAY AFTER 24 HOURS.    CALL YOUR DOCTOR FOR ANY OF THE FOLLOWING:  SIGNS OF INFECTION (FEVER, GROWING TENDERNESS AT THE SURGERY SITE, A LARGE AMOUNT OF DRAINAGE OR BLEEDING, SEVERE PAIN, FOUL-SMELLING DRAINAGE, REDNESS OR SWELLING.    IT HAS BEEN OVER 8 TO 10 HOURS SINCE SURGERY AND YOU ARE STILL NOT ABLE TO URINATE (PASS WATER).

## 2021-12-02 NOTE — ANESTHESIA POSTPROCEDURE EVALUATION
Patient: Mickey Donald    Procedure: Procedure(s):  APPENDECTOMY, OPEN       Diagnosis:Acute appendicitis with localized peritonitis, without perforation, abscess, or gangrene [K35.30]  Diagnosis Additional Information: No value filed.    Anesthesia Type:  General    Note:  Disposition: Outpatient   Postop Pain Control: Uneventful            Sign Out: Well controlled pain   PONV: No   Neuro/Psych: Uneventful            Sign Out: Acceptable/Baseline neuro status   Airway/Respiratory: Uneventful            Sign Out: Acceptable/Baseline resp. status   CV/Hemodynamics: Uneventful            Sign Out: Acceptable CV status; No obvious hypovolemia; No obvious fluid overload   Other NRE: NONE   DID A NON-ROUTINE EVENT OCCUR? No           Last vitals:  Vitals Value Taken Time   BP     Temp     Pulse     Resp     SpO2         Electronically Signed By: Saqib Casanova MD  December 2, 2021  12:02 PM

## 2021-12-02 NOTE — PROGRESS NOTES
"PRIMARY DIAGNOSIS: Appendicitis  OUTPATIENT/OBSERVATION GOALS TO BE MET BEFORE DISCHARGE:  1. ADLs back to baseline: Yes    2. Activity and level of assistance: Ambulating independently.    3. Pain status: Pain free.    4. Return to near baseline physical activity: Yes     Discharge Planner Nurse   Safe discharge environment identified: No  Barriers to discharge: Yes       Entered by: Scott Henderson 12/02/2021 1:35 AM  BP (!) 160/91 (BP Location: Right arm)   Pulse 91   Temp 98.7  F (37.1  C) (Oral)   Resp 16   Ht 1.753 m (5' 9\")   Wt 73.3 kg (161 lb 8 oz)   SpO2 97%   BMI 23.85 kg/m    Hypertensive but otherwise VSS on RA. AxOx4 and able to make needs known. Ambulating independently. NPO. Denies pain or nausea. IVF running @ 75mL/hr. 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 Plan to hold lisinopril and maintain NPO status, and providing IVF and analgesia/antiemetics PRN, while monitoring LFTs until surgery tomorrow. Pt is agreeable to POC and resting peacefully. Will continue to provide supportive cares.  Please review provider order for any additional goals.   Nurse to notify provider when observation goals have been met and patient is ready for discharge.  "

## 2021-12-02 NOTE — PHARMACY-ADMISSION MEDICATION HISTORY
Admission medication history interview status for this patient is complete. See [facility] admission navigator for allergy information, prior to admission medications and immunization status.     Medication history interview done, indicate source(s): Patient  Medication history resources (including written lists, pill bottles, clinic record):None  Pharmacy: [facility]     Changes made to PTA medication list:  Added: Metoprolol succinate 25 mg BID  Changed: none  Reported as Not Taking: PRN Atarax, Isosorbide dinitrate, PRN oxycodone   Removed: PRN Atarax, Isosorbide dinitrate, PRN oxycodone     Actions taken by pharmacist (provider contacted, etc):None     Additional medication history information:None    Medication reconciliation/reorder completed by provider prior to medication history?  No       Prior to Admission medications    Medication Sig Last Dose Taking? Auth Provider   AMLODIPINE BESYLATE PO Take 10 mg by mouth daily 12/1/2021 at am  Yes Reported, Patient   ASPIRIN PO Take 81 mg by mouth daily 12/1/2021 at am  Yes Reported, Patient   Clopidogrel Bisulfate (PLAVIX PO) Take 75 mg by mouth daily 12/1/2021 at am  Yes Reported, Patient   LISINOPRIL PO Take 20 mg by mouth daily 12/1/2021 at am  Yes Reported, Patient   metoprolol succinate ER (TOPROL-XL) 25 MG 24 hr tablet Take 25 mg by mouth 2 times daily 12/1/2021 at am Yes Unknown, Entered By History   polyethylene glycol (MIRALAX) powder Take 17 g (1 capful) by mouth 2 times daily as needed for constipation  Yes Karri Dukes MD   PRAVASTATIN SODIUM PO Take 20 mg by mouth every evening  11/30/2021 at pm  Yes Reported, Patient   COMPRESSION STOCKINGS 1 each continuous   Andrea Christensen PA-C

## 2021-12-02 NOTE — PLAN OF CARE
"PRIMARY DIAGNOSIS: Appendicitis  OUTPATIENT/OBSERVATION GOALS TO BE MET BEFORE DISCHARGE:  1. ADLs back to baseline: Yes    2. Activity and level of assistance: Ambulating independently.    3. Pain status: Pain free.    4. Return to near baseline physical activity: Yes     Discharge Planner Nurse   Safe discharge environment identified: No  Barriers to discharge: Yes       Entered by: Scott Henderson 12/02/2021 4:15 AM  /82 (BP Location: Left arm)   Pulse 89   Temp 98.6  F (37  C) (Oral)   Resp 16   Ht 1.753 m (5' 9\")   Wt 73.3 kg (161 lb 8 oz)   SpO2 96%   BMI 23.85 kg/m    VSS on RA. AxOx4 and able to make needs known. Ambulating independently. NPO. Denies pain or nausea. IVF running @ 75mL/hr. Administering Cefotan as ordered. EKG completed showing SR 90s w/ 1st degree AVB w/ premature PVCs and incomplete LBBB. Plan to hold lisinopril and maintain NPO status, and providing IVF and analgesia/antiemetics PRN, while monitoring LFTs until surgery at 1730 tomorrow. Pt is agreeable to POC and resting peacefully. Will continue to provide supportive cares.  Please review provider order for any additional goals.   Nurse to notify provider when observation goals have been met and patient is ready for discharge.  "

## 2021-12-02 NOTE — ANESTHESIA POSTPROCEDURE EVALUATION
Patient: Mickey Donald    Procedure: Procedure(s):  APPENDECTOMY, OPEN       Diagnosis:Acute appendicitis with localized peritonitis, without perforation, abscess, or gangrene [K35.30]  Diagnosis Additional Information: No value filed.    Anesthesia Type:  General    Note:  Disposition: Outpatient   Postop Pain Control: Uneventful            Sign Out: Well controlled pain   PONV: No   Neuro/Psych: Uneventful            Sign Out: Acceptable/Baseline neuro status   Airway/Respiratory: Uneventful            Sign Out: Acceptable/Baseline resp. status   CV/Hemodynamics: Uneventful            Sign Out: Acceptable CV status; No obvious hypovolemia; No obvious fluid overload   Other NRE: NONE   DID A NON-ROUTINE EVENT OCCUR? No           Last vitals:  Vitals Value Taken Time   /66 12/02/21 1245   Temp 97.2  F (36.2  C) 12/02/21 1240   Pulse 81 12/02/21 1251   Resp 13 12/02/21 1251   SpO2 94 % 12/02/21 1250   Vitals shown include unvalidated device data.    Electronically Signed By: Saqib Casanova MD  December 2, 2021  1:13 PM

## 2021-12-02 NOTE — H&P
[facility]    History and Physical - Hospitalist Service       Date of Admission:  12/1/2021    Assessment & Plan      Mickey Donald is a 89 year old male admitted on 12/1/2021.      Acute appendicitis  Patient will be kept n.p.o.  IV fluids will be given  Pain medication as well as nausea medication will be given  Surgeon is aware that the patient is admitted in would be coming and evaluating the patient as per ER physician  ECG will be ordered before the surgery to evaluate preoperative evaluation    Cholelithiasis  LFTs will be added to the blood  Patient does not have any Johnston sign  We will discuss this with the surgeon    Coronary artery disease  No complaints of chest pain or shortness of breath  On aspirin, Plavix, isosorbide, pravastatin  Patient is not on beta-blockade    Essential hypertension  On amlodipine 10 mg daily, lisinopril 20 mg daily  Will hold lisinopril in this perioperative state    Chronic kidney disease  Creatinine has is at baseline at 1.36 which it was also in June 2017       Diet:   NPO  DVT Prophylaxis: Pneumatic Compression Devices  Prince Catheter: Not present  Central Lines: None  Code Status:  Full code    Clinically Significant Risk Factors Present on Admission              # Platelet Defect: home medication list includes an antiplatelet medication       The patient's care was discussed with the Patient.    Casimiro Pelayo MD  [facility]  Securely message with the Vocera Web Console (learn more here)  Text page via [employer] Paging/Directory        ______________________________________________________________________    Chief Complaint   Abdominal pain    History is obtained from the patient, my discussion with the emergency department physician and the medical records.     History of Present Illness   Mickey Donald is a hard of hearing pleasant 89 year old  who lives alone though his daughter lives around and helps him out.   He has history of coronary artery disease on treatment with aspirin and Plavix, hypertension on treatment with amlodipine, lisinopril, osteoarthritis, hypercholesterolemia on pravastatin.    For the past 7 days patient has had abdominal pain which has been shifting across his abdomen but lately has localized to the right lower quadrant which does not bother him when he is not moving but each time he gets up and moves the pain increases. He says that earlier it increased when he eat. He did have fever 2 days before admission associated with chills otherwise no history of nausea vomiting or constipation with his last bowel movement a day prior to admission.  Patient has been eating food up until the morning of the day of admission.     Because the pain was not getting better and in the systolic man it is 3/10 but I think it might be higher, he went to clinic from where he was referred to a CT scan which revealed acute appendicitis as mentioned below in [facility].  From there he was referred to the emergency department at [facility].  Unfortunately surgery could not be done on the day of admission due to logistic reasons but the family and the patient want to stay here and get surgery done when it is available.  He was given cefotetan antibiotic, started on IV fluids and kept n.p.o. for surgery.         Review of Systems    Denies any headache, blurring of vision or double vision, neck pain jaw pain or shoulder pain, chest pain, shortness of breath, cough or increased sputum production, nausea or vomiting, otherwise as mentioned above has abdominal pain, without any diarrhea or constipation.  Denies any urinary symptoms of burning or increased frequency. Denies any weakness of upper or lower extremities or any seizure activity. Fever or chills. Bleeding from anywhere else.  No rashes or cellulitis.      Past Medical History    I have reviewed this patient's medical history and updated it with  pertinent information if needed.   Past Medical History:   Diagnosis Date     Coronary artery disease      Hypertension      Osteoarthritis     knees     Stented coronary artery            Past Surgical History   I have reviewed this patient's surgical history and updated it with pertinent information if needed.  Past Surgical History:   Procedure Laterality Date     ARTHROPLASTY KNEE Right 2017    Procedure: ARTHROPLASTY KNEE;  Right total knee arthroplasty;  Surgeon: Saqib Seaman MD;  Location: [facility] OR     CARDIAC SURGERY      stents cardiac      EYE SURGERY      cataract surg right eye     HERNIORRHAPHY INGUINAL  2013    Procedure: HERNIORRHAPHY INGUINAL;  Right Inguinal Hernia Repair with Mesh;  Surgeon: Young Kemp MD;  Location: [facility] OR       Social History   I have reviewed this patient's social history and updated it with pertinent information if needed.  Social History     Tobacco Use     Smoking status: Former Smoker     Quit date: 1968     Years since quittin.9     Smokeless tobacco: Not on file     Tobacco comment: quit    Substance Use Topics     Alcohol use: No     Drug use: No       Family History   Patient has multiple family members with history of cancer his father having brain tumor, sister having lung cancer though his mother  of dementia    Prior to Admission Medications   Prior to Admission Medications   Prescriptions Last Dose Informant Patient Reported? Taking?   AMLODIPINE BESYLATE PO   Yes No   Sig: Take 10 mg by mouth daily   ASPIRIN PO   Yes No   Sig: Take 81 mg by mouth daily   COMPRESSION STOCKINGS   No No   Si each continuous   Clopidogrel Bisulfate (PLAVIX PO)   Yes No   Sig: Take 75 mg by mouth daily   ISOSORBIDE DINITRATE PO   Yes No   Sig: Take 60 mg by mouth daily    LISINOPRIL PO   Yes No   Sig: Take 20 mg by mouth daily   PRAVASTATIN SODIUM PO   Yes No   Sig: Take 20 mg by mouth every evening    hydrOXYzine (ATARAX) 25 MG tablet   No  No   Sig: Take 1 tablet (25 mg) by mouth every 6 hours as needed for itching   oxyCODONE (OXY-IR) 5 MG capsule   No No   Si tabs every 4 hours as needed for pain.   polyethylene glycol (MIRALAX) powder   No No   Sig: Take 17 g (1 capful) by mouth 2 times daily as needed for constipation      Facility-Administered Medications: None     Allergies   No Known Allergies    Physical Exam   Vital Signs: Temp: 98.9  F (37.2  C) Temp src: Oral BP: (!) 161/89 Pulse: 96   Resp: 18 SpO2: 98 %      Weight: 166 lbs .1 oz      GENERAL:  Patient does not look in any acute distress  HEENT:  EOM+, Conjunctiva is clear    NECK: , no Jugular Venous distention  HEART: S1 S2 regular  Rate and Rhythm,   no murmur,    LUNGS: Respirations are not laboured, Lungs are clear to auscultation Crepitations or Wheezing   ABDOMEN: Soft, there is guarding present in the lower abdomen with right lower quadrant tenderness, Bowel Sounds are decreased but positive   LOWER LIMBS:  Pedal Edema  Bilaterally   CNS:  Alert,  Oriented x 3, Moving all the Four Limbs       Data   Data reviewed today: I reviewed all medications, new labs and imaging results over the last 24 hours. I personally reviewed  Most Recent 3 CBC's:Recent Labs   Lab Test 21  1630 17  0644 17  0705   WBC 9.9  --   --    HGB 11.2* 7.7* 8.6*   MCV 95  --   --      --   --      Most Recent 3 BMP's:Recent Labs   Lab Test 21  1630 17  0644 17  0705 17  0715     --   --   --    POTASSIUM 4.2  --   --  4.0   CHLORIDE 103  --   --   --    CO2 26  --   --   --    BUN 28  --   --   --    CR 1.36*  --   --  1.36*   ANIONGAP 5  --   --   --    KEYONNA 8.9  --   --   --    * 111* 185*  --      Most Recent 2 LFT's:No lab results found.  Most Recent 3 INR's:Recent Labs   Lab Test 17  0638 17  0644 17  0705   INR 1.05 1.13 1.17*     CT scan of the abdomen that was done in [facility] facility on the day of admission 2021  showed:  LOWER CHEST: Minimal bibasilar atelectasis. Visualized lungs are otherwise clear. No pleural effusion. Transcatheter aortic valve replacement and three-vessel coronary artery calcification.     HEPATOBILIARY: Liver is normal. No bile duct dilatation. Cholelithiasis. Gallbladder otherwise unremarkable.     PANCREAS: Normal.     SPLEEN: Spleen size normal.     ADRENAL GLANDS: Normal.     KIDNEY/BLADDER: Kidneys, ureters and bladder are normal.     BOWEL: Uncomplicated acute appendicitis with no abscess or CT sign of perforation. Appendix is situated in the right lower quadrant (series 2 image 114). Mild colonic diverticulosis with no diverticulitis.     LYMPH NODES: No lymphadenopathy.     VASCULATURE: Moderate to severe calcified atheromatous plaque throughout the normal caliber abdominal aorta, iliofemoral arteries and at the origin of the renal and mesenteric arteries.     PELVIC ORGANS: No pelvic mass or fluid.     MUSCULOSKELETAL: Chronic pars defects at L5 with 10 mm anterolisthesis of L5 on S1     IMPRESSION:   1.  Uncomplicated acute appendicitis.    2.  Cholelithiasis.         No results found for this or any previous visit (from the past 24 hour(s)).

## 2021-12-02 NOTE — ED PROVIDER NOTES
History   Chief Complaint:  Abdominal Pain       The history is provided by the patient and a relative.      Mickey Donald is a 89 year old male on Plavix and aspirin with history of hypertension and inguinal hernia who presents with abdominal pain. Per his family member, pain began on Thursday. He went to the doctor earlier today where a CT scan was performed. Results showed an infected appendix. See results below.  Also, he reports bilateral back pain. He ate one piece of toast this morning, although he has not eaten much due to the pain. He has a past medical history of hernias and mentions previous hernia repair surgery.    Imaging results:   CT ABDOMEN PELVIS W  IMPRESSION:   1.  Uncomplicated acute appendicitis. Dr. Agosto paged at 1:13 PM 12/01/2021.  2.  Cholelithiasis.     Review of Systems   Gastrointestinal: Positive for abdominal pain.   Musculoskeletal: Positive for back pain.   All other systems reviewed and are negative.      Allergies:  No Known Allergies    Medications:  Amlodipine Besylate   Aspirin   Plavix   Atarax   Lisinopril   Pravastatin sodium   Isosorbide dinitrate     Past Medical History:     Coronary artery disease   Hypertension   Arthritis   Osteoarthritis   Degenerative   Hernia     Past Surgical History:    Stented coronary artery   Arthoplasty knee   Cataract , right eye   Herniorrhaphy inguinal     Family History:    Brother: COPD, dementia, heart disease  Father: brain cancer   Sister: lung cancer     Social History:  Presents with family member.  PCP: Young Henley    Physical Exam     Patient Vitals for the past 24 hrs:   BP Temp Temp src Pulse Resp SpO2 Weight   12/01/21 2015 -- -- -- -- -- 96 % --   12/01/21 2012 -- -- -- -- -- 97 % --   12/01/21 2000 (!) 164/87 -- -- 97 -- -- --   12/01/21 1958 -- -- -- -- -- 96 % --   12/1935 -- -- -- -- -- 96 % --   12/01/21 1930 (!) 155/77 -- -- 90 -- 96 % --   12/01/21 1900 (!) 161/89 -- -- 96 -- 98 % --   12/01/21 1845 -- --  -- -- -- 97 % --   12/01/21 1830 (!) 161/76 -- -- 90 -- 98 % --   12/01/21 1820 -- -- -- -- -- 99 % --   12/01/21 1816 -- -- -- -- -- 99 % --   12/01/21 1815 (!) 173/74 -- -- 92 -- -- --   12/01/21 1518 (!) 160/77 98.9  F (37.2  C) Oral 89 18 99 % --   12/01/21 1511 -- -- -- -- -- -- 75.3 kg (166 lb 0.1 oz)       Physical Exam    Nursing note and vitals reviewed.  HENT:   Mouth/Throat: Moist mucous membranes.   Eyes: EOMI, nonicteric sclera  Cardiovascular: Normal rate, regular rhythm, no murmurs, rubs, or gallops  Pulmonary/Chest: Effort normal and breath sounds normal. No respiratory distress. No wheezes. No rales.   Abdominal: Soft. Nontender, nondistended, no guarding or rigidity.   Musculoskeletal: Normal range of motion.   Neurological: Alert. Moves all extremities spontaneously.   Skin: Skin is warm and dry. No rash noted.   Psychiatric: Normal mood and affect.     Emergency Department Course     Laboratory:  Labs Ordered and Resulted from Time of ED Arrival to Time of ED Departure   CBC WITH PLATELETS - Abnormal       Result Value    WBC Count 9.9      RBC Count 3.69 (*)     Hemoglobin 11.2 (*)     Hematocrit 35.0 (*)     MCV 95      MCH 30.4      MCHC 32.0      RDW 12.7      Platelet Count 282     BASIC METABOLIC PANEL - Abnormal    Sodium 134      Potassium 4.2      Chloride 103      Carbon Dioxide (CO2) 26      Anion Gap 5      Urea Nitrogen 28      Creatinine 1.36 (*)     Calcium 8.9      Glucose 125 (*)     GFR Estimate 46 (*)    COVID-19 VIRUS (CORONAVIRUS) BY PCR - Normal    SARS CoV2 PCR Negative          Emergency Department Course:  Reviewed:  I reviewed nursing notes, vitals, past medical history and Care Everywhere    Assessments:  1805 I obtained history and examined the patient as noted above.   1855 I rechecked the patient and explained findings.     Consults:  1810 : I spoke with Dr. Mcgee of the General Surgery service from [facility] regarding patient's presentation, findings, and  plan of care.    1830 : I spoke with Dr. Mcgee of the General Surgery service from [facility] regarding patient's presentation, findings, and plan of care    1922 : I spoke with Dr. Pelayo of the Hospitalist service from [facility] regarding patient's presentation, findings, and plan of care.      Interventions:  Medications   cefoTEtan (CEFOTAN) 1 g vial to attach to  ml bag (0 g Intravenous Stopped 12/1/21 1901)       Disposition:  The patient was admitted to the hospital under the care of Dr. Pelayo.     Impression & Plan       Medical Decision Making:  Pt presents with CC abdominal pain. He was evaluated before ED arrival and found to have uncomplicated acute appendicitis. I called general surgery and spoke with Dr. Mcgee. Unfortunately, our OR is unavailable tonight due to a critical patient in PACU. I offered pt transfer to a different hospital which he and his daughter did not desire. I also offered antibiotic treatment only, which they also decline. They would prefer to wait until OR availability which is likely in AM. Updated surgery as to pt's decision. Dr. Mcgee asked hospitalist to admit given cardiovascular comorbidities. Dr. Pelayo accepted pt for admission. All questions answered.     Diagnosis:    ICD-10-CM    1. Acute appendicitis with localized peritonitis, without perforation, abscess, or gangrene  K35.30 Case Request: APPENDECTOMY, OPEN     Case Request: APPENDECTOMY, OPEN       Scribe Disclosure:  JAVIER, João Hatch, am serving as a scribe at 6:00 PM on 12/1/2021 to document services personally performed by Wesley Kruse MD based on my observations and the provider's statements to me.          Wesley Kruse MD  12/02/21 5755

## 2021-12-02 NOTE — PLAN OF CARE
Report given to PACU. Patient brought to PACU in stable condition and all belongings with patient. Plan to discharge from PACU.

## 2021-12-02 NOTE — OP NOTE
PREOPERATIVE DIAGNOSIS: Acute appendicitis.   POSTOPERATIVE DIAGNOSIS: Acute non-perforated appendicitis.   PROCEDURE: Appendectomy.   ANESTHESIA: General.   PREOPERATIVE MEDICATION: Cefotan IV.   SURGEON: Libertad Kyle MD   ASSISTANT: NONE    INDICATIONS: Mickey Donald is a 89 year old male who presents with about a 8 days history of RLQ abdominal pain.  He has had anorexia and chills.  He has a physicial exam consistant with appendicitis and CT evidence of acute appendicitis.  He is brought to the operating room at this time for appendectomy.   PROCEDURE: The patient was placed supine, abdomen prepped and draped in usual sterile fashion. Right lower quadrant transverse incision is made and the abdomen is entered through the rectus sheath without dividing any rectus muscle fibers. Upon entering the abdomen, there was a small amount of clear fluid. Yfn wound protector was then placed in position and the appendix was palpable  through the incision site, inflamed and adherent  in the retrocecal position. The appendix was mobilized up into the incision and is clearly inflamed and the tip is scarred but without gross perforation. The mesoappendix was taken down by double ligation and division. The base is divided with a JESSEE stapler. The right lower quadrant was then copiously irrigated with Ancef solution and the returns are clear. Incision is then closed using running 0 Vicryl for anterior and posterior fascia and 4-0 subcuticular Monocryl for skin. 0.25% Marcaine was instilled for postoperative pain control. The patient transferred to recovery in good condition.   ESTIMATED BLOOD LOSS: 15 cc.   INTRAOPERATIVE FINDINGS: Acute non-perforated retrocecal appendicitis.   PLAN:    Okay to discharge when criteria are met.  Will discharge on 5 more days of antibiotics.

## 2021-12-03 LAB
ATRIAL RATE - MUSE: 95 BPM
DIASTOLIC BLOOD PRESSURE - MUSE: NORMAL MMHG
INTERPRETATION ECG - MUSE: NORMAL
P AXIS - MUSE: 49 DEGREES
PATH REPORT.COMMENTS IMP SPEC: NORMAL
PATH REPORT.COMMENTS IMP SPEC: NORMAL
PATH REPORT.FINAL DX SPEC: NORMAL
PATH REPORT.GROSS SPEC: NORMAL
PATH REPORT.MICROSCOPIC SPEC OTHER STN: NORMAL
PATH REPORT.RELEVANT HX SPEC: NORMAL
PHOTO IMAGE: NORMAL
PR INTERVAL - MUSE: 240 MS
QRS DURATION - MUSE: 118 MS
QT - MUSE: 358 MS
QTC - MUSE: 449 MS
R AXIS - MUSE: -27 DEGREES
SYSTOLIC BLOOD PRESSURE - MUSE: NORMAL MMHG
T AXIS - MUSE: 79 DEGREES
VENTRICULAR RATE- MUSE: 95 BPM

## 2021-12-03 PROCEDURE — 88304 TISSUE EXAM BY PATHOLOGIST: CPT | Mod: 26 | Performed by: PATHOLOGY

## 2021-12-05 NOTE — RESULT ENCOUNTER NOTE
These results are as expected and will be discussed at the follow up visit or call.  Libertad Kyle MD

## 2021-12-20 ENCOUNTER — TELEPHONE (OUTPATIENT)
Dept: SURGERY | Facility: CLINIC | Age: 86
End: 2021-12-20
Payer: COMMERCIAL

## 2021-12-20 NOTE — TELEPHONE ENCOUNTER
Attempted to call patient for post op check.  No answer.  Message was left for patient to call back if they had any questions of concerns.     Mel Ashton PA-C

## 2025-01-09 ENCOUNTER — HOSPITAL ENCOUNTER (OUTPATIENT)
Facility: CLINIC | Age: OVER 89
Setting detail: OBSERVATION
End: 2025-01-09
Attending: EMERGENCY MEDICINE
Payer: COMMERCIAL

## 2025-01-09 ENCOUNTER — APPOINTMENT (OUTPATIENT)
Dept: MRI IMAGING | Facility: CLINIC | Age: OVER 89
End: 2025-01-09
Attending: EMERGENCY MEDICINE
Payer: COMMERCIAL

## 2025-01-09 VITALS
DIASTOLIC BLOOD PRESSURE: 94 MMHG | HEART RATE: 75 BPM | TEMPERATURE: 97.8 F | RESPIRATION RATE: 18 BRPM | OXYGEN SATURATION: 98 % | SYSTOLIC BLOOD PRESSURE: 209 MMHG

## 2025-01-09 DIAGNOSIS — G45.9 TIA (TRANSIENT ISCHEMIC ATTACK): ICD-10-CM

## 2025-01-09 LAB
ALBUMIN SERPL BCG-MCNC: 4.1 G/DL (ref 3.5–5.2)
ALP SERPL-CCNC: 69 U/L (ref 40–150)
ALT SERPL W P-5'-P-CCNC: 13 U/L (ref 0–70)
ANION GAP SERPL CALCULATED.3IONS-SCNC: 7 MMOL/L (ref 7–15)
APTT PPP: 30 SECONDS (ref 22–38)
AST SERPL W P-5'-P-CCNC: 16 U/L (ref 0–45)
ATRIAL RATE - MUSE: 70 BPM
BASOPHILS # BLD AUTO: 0 10E3/UL (ref 0–0.2)
BASOPHILS NFR BLD AUTO: 1 %
BILIRUB SERPL-MCNC: 0.6 MG/DL
BUN SERPL-MCNC: 24.8 MG/DL (ref 8–23)
CALCIUM SERPL-MCNC: 9.4 MG/DL (ref 8.8–10.4)
CHLORIDE SERPL-SCNC: 103 MMOL/L (ref 98–107)
CHOLEST SERPL-MCNC: 149 MG/DL
CREAT SERPL-MCNC: 1.36 MG/DL (ref 0.67–1.17)
DIASTOLIC BLOOD PRESSURE - MUSE: NORMAL MMHG
EGFRCR SERPLBLD CKD-EPI 2021: 49 ML/MIN/1.73M2
EOSINOPHIL # BLD AUTO: 0.2 10E3/UL (ref 0–0.7)
EOSINOPHIL NFR BLD AUTO: 3 %
ERYTHROCYTE [DISTWIDTH] IN BLOOD BY AUTOMATED COUNT: 13.2 % (ref 10–15)
EST. AVERAGE GLUCOSE BLD GHB EST-MCNC: 131 MG/DL
GLUCOSE SERPL-MCNC: 109 MG/DL (ref 70–99)
HBA1C MFR BLD: 6.2 %
HCO3 SERPL-SCNC: 26 MMOL/L (ref 22–29)
HCT VFR BLD AUTO: 34.9 % (ref 40–53)
HDLC SERPL-MCNC: 55 MG/DL
HGB BLD-MCNC: 11.4 G/DL (ref 13.3–17.7)
HOLD SPECIMEN: NORMAL
IMM GRANULOCYTES # BLD: 0 10E3/UL
IMM GRANULOCYTES NFR BLD: 0 %
INR PPP: 1.07 (ref 0.85–1.15)
INTERPRETATION ECG - MUSE: NORMAL
LDLC SERPL CALC-MCNC: 75 MG/DL
LYMPHOCYTES # BLD AUTO: 1.2 10E3/UL (ref 0.8–5.3)
LYMPHOCYTES NFR BLD AUTO: 20 %
MCH RBC QN AUTO: 29.6 PG (ref 26.5–33)
MCHC RBC AUTO-ENTMCNC: 32.7 G/DL (ref 31.5–36.5)
MCV RBC AUTO: 91 FL (ref 78–100)
MONOCYTES # BLD AUTO: 0.5 10E3/UL (ref 0–1.3)
MONOCYTES NFR BLD AUTO: 8 %
NEUTROPHILS # BLD AUTO: 4.2 10E3/UL (ref 1.6–8.3)
NEUTROPHILS NFR BLD AUTO: 69 %
NONHDLC SERPL-MCNC: 94 MG/DL
NRBC # BLD AUTO: 0 10E3/UL
NRBC BLD AUTO-RTO: 0 /100
P AXIS - MUSE: 52 DEGREES
PLATELET # BLD AUTO: 201 10E3/UL (ref 150–450)
POTASSIUM SERPL-SCNC: 4.4 MMOL/L (ref 3.4–5.3)
PR INTERVAL - MUSE: 244 MS
PROT SERPL-MCNC: 6.8 G/DL (ref 6.4–8.3)
QRS DURATION - MUSE: 116 MS
QT - MUSE: 402 MS
QTC - MUSE: 434 MS
R AXIS - MUSE: -22 DEGREES
RBC # BLD AUTO: 3.85 10E6/UL (ref 4.4–5.9)
SODIUM SERPL-SCNC: 136 MMOL/L (ref 135–145)
SYSTOLIC BLOOD PRESSURE - MUSE: NORMAL MMHG
T AXIS - MUSE: 59 DEGREES
TRIGL SERPL-MCNC: 94 MG/DL
TROPONIN T SERPL HS-MCNC: 28 NG/L
VENTRICULAR RATE- MUSE: 70 BPM
WBC # BLD AUTO: 6.1 10E3/UL (ref 4–11)

## 2025-01-09 PROCEDURE — 70553 MRI BRAIN STEM W/O & W/DYE: CPT

## 2025-01-09 PROCEDURE — G0378 HOSPITAL OBSERVATION PER HR: HCPCS

## 2025-01-09 PROCEDURE — 99223 1ST HOSP IP/OBS HIGH 75: CPT | Performed by: HOSPITALIST

## 2025-01-09 PROCEDURE — 82040 ASSAY OF SERUM ALBUMIN: CPT | Performed by: EMERGENCY MEDICINE

## 2025-01-09 PROCEDURE — 84484 ASSAY OF TROPONIN QUANT: CPT | Performed by: EMERGENCY MEDICINE

## 2025-01-09 PROCEDURE — 83036 HEMOGLOBIN GLYCOSYLATED A1C: CPT | Performed by: HOSPITALIST

## 2025-01-09 PROCEDURE — 85610 PROTHROMBIN TIME: CPT | Performed by: EMERGENCY MEDICINE

## 2025-01-09 PROCEDURE — 80061 LIPID PANEL: CPT | Performed by: HOSPITALIST

## 2025-01-09 PROCEDURE — A9585 GADOBUTROL INJECTION: HCPCS | Performed by: EMERGENCY MEDICINE

## 2025-01-09 PROCEDURE — 250N000013 HC RX MED GY IP 250 OP 250 PS 637: Performed by: HOSPITALIST

## 2025-01-09 PROCEDURE — 36415 COLL VENOUS BLD VENIPUNCTURE: CPT | Performed by: EMERGENCY MEDICINE

## 2025-01-09 PROCEDURE — 70544 MR ANGIOGRAPHY HEAD W/O DYE: CPT

## 2025-01-09 PROCEDURE — 85025 COMPLETE CBC W/AUTO DIFF WBC: CPT | Performed by: EMERGENCY MEDICINE

## 2025-01-09 PROCEDURE — 255N000002 HC RX 255 OP 636: Performed by: EMERGENCY MEDICINE

## 2025-01-09 PROCEDURE — 82465 ASSAY BLD/SERUM CHOLESTEROL: CPT | Performed by: HOSPITALIST

## 2025-01-09 PROCEDURE — 84155 ASSAY OF PROTEIN SERUM: CPT | Performed by: EMERGENCY MEDICINE

## 2025-01-09 PROCEDURE — 93005 ELECTROCARDIOGRAM TRACING: CPT

## 2025-01-09 PROCEDURE — 250N000013 HC RX MED GY IP 250 OP 250 PS 637: Performed by: EMERGENCY MEDICINE

## 2025-01-09 PROCEDURE — 85730 THROMBOPLASTIN TIME PARTIAL: CPT | Performed by: EMERGENCY MEDICINE

## 2025-01-09 PROCEDURE — 85014 HEMATOCRIT: CPT | Performed by: EMERGENCY MEDICINE

## 2025-01-09 PROCEDURE — 70549 MR ANGIOGRAPH NECK W/O&W/DYE: CPT

## 2025-01-09 PROCEDURE — 99285 EMERGENCY DEPT VISIT HI MDM: CPT | Mod: 25

## 2025-01-09 RX ORDER — LISINOPRIL 30 MG/1
30 TABLET ORAL AT BEDTIME
COMMUNITY
Start: 2024-12-11

## 2025-01-09 RX ORDER — ONDANSETRON 4 MG/1
4 TABLET, ORALLY DISINTEGRATING ORAL EVERY 6 HOURS PRN
Status: DISCONTINUED | OUTPATIENT
Start: 2025-01-09 | End: 2025-01-10 | Stop reason: HOSPADM

## 2025-01-09 RX ORDER — LIDOCAINE 40 MG/G
CREAM TOPICAL
Status: DISCONTINUED | OUTPATIENT
Start: 2025-01-09 | End: 2025-01-10 | Stop reason: HOSPADM

## 2025-01-09 RX ORDER — PRAVASTATIN SODIUM 20 MG
20 TABLET ORAL AT BEDTIME
Status: DISCONTINUED | OUTPATIENT
Start: 2025-01-09 | End: 2025-01-10 | Stop reason: HOSPADM

## 2025-01-09 RX ORDER — ACETAMINOPHEN 325 MG/1
650 TABLET ORAL EVERY 6 HOURS PRN
Status: DISCONTINUED | OUTPATIENT
Start: 2025-01-09 | End: 2025-01-10 | Stop reason: HOSPADM

## 2025-01-09 RX ORDER — HYDRALAZINE HYDROCHLORIDE 20 MG/ML
10-20 INJECTION INTRAMUSCULAR; INTRAVENOUS
Status: DISCONTINUED | OUTPATIENT
Start: 2025-01-09 | End: 2025-01-10 | Stop reason: HOSPADM

## 2025-01-09 RX ORDER — AMLODIPINE BESYLATE 10 MG/1
10 TABLET ORAL DAILY
COMMUNITY
Start: 2024-05-15

## 2025-01-09 RX ORDER — ACETAMINOPHEN 650 MG/1
650 SUPPOSITORY RECTAL EVERY 4 HOURS PRN
Status: DISCONTINUED | OUTPATIENT
Start: 2025-01-09 | End: 2025-01-10 | Stop reason: HOSPADM

## 2025-01-09 RX ORDER — ASPIRIN 81 MG/1
81 TABLET ORAL DAILY
Status: DISCONTINUED | OUTPATIENT
Start: 2025-01-10 | End: 2025-01-10 | Stop reason: HOSPADM

## 2025-01-09 RX ORDER — GADOBUTROL 604.72 MG/ML
10 INJECTION INTRAVENOUS ONCE
Status: COMPLETED | OUTPATIENT
Start: 2025-01-09 | End: 2025-01-09

## 2025-01-09 RX ORDER — PRAVASTATIN SODIUM 20 MG
20 TABLET ORAL AT BEDTIME
Status: ON HOLD | COMMUNITY
Start: 2024-07-09 | End: 2025-01-10

## 2025-01-09 RX ORDER — CLOPIDOGREL BISULFATE 75 MG/1
75 TABLET ORAL DAILY
Status: DISCONTINUED | OUTPATIENT
Start: 2025-01-10 | End: 2025-01-10 | Stop reason: HOSPADM

## 2025-01-09 RX ORDER — ACETAMINOPHEN 325 MG/10.15ML
650 LIQUID ORAL EVERY 4 HOURS PRN
Status: DISCONTINUED | OUTPATIENT
Start: 2025-01-09 | End: 2025-01-10 | Stop reason: HOSPADM

## 2025-01-09 RX ORDER — CLOPIDOGREL 300 MG/1
300 TABLET, FILM COATED ORAL ONCE
Status: DISCONTINUED | OUTPATIENT
Start: 2025-01-09 | End: 2025-01-09

## 2025-01-09 RX ORDER — CLOBETASOL PROPIONATE 0.5 MG/G
CREAM TOPICAL 2 TIMES DAILY PRN
COMMUNITY
Start: 2024-04-12

## 2025-01-09 RX ORDER — CLOPIDOGREL 300 MG/1
300 TABLET, FILM COATED ORAL ONCE
Status: COMPLETED | OUTPATIENT
Start: 2025-01-09 | End: 2025-01-09

## 2025-01-09 RX ORDER — ASPIRIN 81 MG/1
81 TABLET ORAL DAILY
COMMUNITY

## 2025-01-09 RX ORDER — LABETALOL HYDROCHLORIDE 5 MG/ML
10-20 INJECTION, SOLUTION INTRAVENOUS EVERY 10 MIN PRN
Status: DISCONTINUED | OUTPATIENT
Start: 2025-01-09 | End: 2025-01-10 | Stop reason: HOSPADM

## 2025-01-09 RX ORDER — ASPIRIN 81 MG/1
81 TABLET, CHEWABLE ORAL ONCE
Status: COMPLETED | OUTPATIENT
Start: 2025-01-09 | End: 2025-01-09

## 2025-01-09 RX ORDER — ONDANSETRON 2 MG/ML
4 INJECTION INTRAMUSCULAR; INTRAVENOUS EVERY 6 HOURS PRN
Status: DISCONTINUED | OUTPATIENT
Start: 2025-01-09 | End: 2025-01-10 | Stop reason: HOSPADM

## 2025-01-09 RX ADMIN — GADOBUTROL 10 ML: 604.72 INJECTION INTRAVENOUS at 14:33

## 2025-01-09 RX ADMIN — PRAVASTATIN SODIUM 20 MG: 20 TABLET ORAL at 21:41

## 2025-01-09 RX ADMIN — ASPIRIN 81 MG CHEWABLE TABLET 81 MG: 81 TABLET CHEWABLE at 17:37

## 2025-01-09 RX ADMIN — CLOPIDOGREL BISULFATE 300 MG: 300 TABLET, FILM COATED ORAL at 17:37

## 2025-01-09 ASSESSMENT — ACTIVITIES OF DAILY LIVING (ADL)
ADLS_ACUITY_SCORE: 48

## 2025-01-09 ASSESSMENT — COLUMBIA-SUICIDE SEVERITY RATING SCALE - C-SSRS
6. HAVE YOU EVER DONE ANYTHING, STARTED TO DO ANYTHING, OR PREPARED TO DO ANYTHING TO END YOUR LIFE?: NO
1. IN THE PAST MONTH, HAVE YOU WISHED YOU WERE DEAD OR WISHED YOU COULD GO TO SLEEP AND NOT WAKE UP?: NO
2. HAVE YOU ACTUALLY HAD ANY THOUGHTS OF KILLING YOURSELF IN THE PAST MONTH?: NO

## 2025-01-09 NOTE — H&P
[facility]    History and Physical - Hospitalist Service       Date of Admission:  1/9/2025    Assessment & Plan      Mickey Donald is a 89 year old male who has medical history which includes coronary artery disease status stent in 2000, hypertension, hyperlipidemia, CKD stage III, chronic anemia who presented to the ED with a chief complaint of altered mental status   And diagnosed with likely TIA and admitted to the hospital on 1/9/2025    Transient aphasia/vision loss likely due to TIA  -On admission presented with symptoms which include aphasia and some vision issues and lasted for likely about an hour and after that patient was back to baseline   -lab work was only concerning for creatinine of 1.36, initial troponin of 28 and WBC count normal at 6.1 with hemoglobin of 11.4  -Neck MRA-normal  -Head MRA-no aneurysm, high flow AVM or significant stenosis  -Head MRI-no acute intracranial process and generalized brain atrophy and presumed microvascular ischemic changes  -Stroke neurology consulted and patient was loaded with Plavix 300 mg and aspirin 81 mg  -On my exam no focal deficits and as per family he is back to near baseline  -Neuro signs and neurochecks every 4 hours  -Discussed with the stroke neurology with Dr. Thruston allow permissive hypertension for today and as needed labetalol and hydralazine available and can liberalize for tomorrow  -Cardiac monitor  -HbA1c  -Lipid panel  -Echo  -PT/OT/speech  -Continue with aspirin 81 mg daily and Plavix 75 mg for 3 weeks and then stop Plavix and continue with aspirin 81 mg alone      History of coronary artery disease status PCI  Hypertension  Hyperlipidemia  -We will continue with PTA statin and hold metoprolol, lisinopril and amlodipine to allow for permissive hypertension    CKD stage III  -His baseline creatinine since 2017 seems to be stable around 1.36  -Creatinine on admission is stable at 1.3  -Continue to monitor    Chronic  anemia  -Hemoglobin on admit is 11.4  -His last hemoglobin in EMR back in 2021 was also between 10-11  -Continue to monitor      Family communication  - I did discuss plan with daughter in person           Diet:  Regular diet  DVT Prophylaxis: Pneumatic Compression Devices  Prince Catheter: Not present  Lines: None     Cardiac Monitoring: None  Code Status:  Full code    Clinically Significant Risk Factors Present on Admission                 # Drug Induced Platelet Defect: home medication list includes an antiplatelet medication   # Hypertension: Noted on problem list                      Disposition Plan     Medically Ready for Discharge: Anticipated Tomorrow, complete the TIA workup           Sameer Morgan MD  Hospitalist Service  [facility]  Securely message with [employer] (more info)  Text page via [employer] Paging/Directory     This note was completed in part using dictation via the Dragon voice recognition software. Some word and grammatical errors may occur and must be interpreted in the appropriate clinical context. If there are any questions pertaining to this issue, please contact me for further clarification.      I am on admitting shift and his care in the morning will be taken over by one of my colleagues from hospital medicine team  ______________________________________________________________________    Chief Complaint     AMS    History is obtained from the patient    History of Present Illness       Mickey Donald is a 89 year old male who has medical history which includes coronary artery disease status stent in 2000, hypertension, hyperlipidemia, CKD stage III, chronic anemia who presented to the ED with a chief complaint of altered mental status.  Patient at baseline does not have any headaches and this morning when he woke up around 2:00 he had right-sided headache which improved.  He was trying to read Bible when he could not read and his boards were blurry and did not  make sense.  His friend noticed that around 9 AM what ever he was speaking was involved [place] and it persisted until 10 AM.  Family visited the patient around that that time and noticed that he was not himself.  After he came to the ED patient was back to normal.  Patient denies any fever, chills, nausea, vomiting.  He denies any shortness of breath or chest discomfort.      ED workup and course    In the ER patient had lab work done which showed sodium of 136, potassium of 4.4, chloride of 103, bicarb of 26, BUN of 24.8 with creatinine of 1.36 with GFR of 49 and rest of the LFTs were stable and initial high sensitive troponin of 28.    WBC count of 6.1, hemoglobin of 11.4 with platelet count of 201    EKG was done which showed sinus rhythm with first-degree AV block    Neck MRA-normal  Head MRA-no aneurysm, high flow AVM or significant stenosis  Head MRI-no acute intracranial process and generalized brain atrophy and presumed microvascular ischemic changes    In the ER his blood pressure was around systolic 180s and the stroke neuroteam was consulted and they did diagnose him with transient aphasia/blurry vision and recommended that he be given 300 mg of Plavix      Past Medical History    Past Medical History:   Diagnosis Date    Coronary artery disease     Hypertension     Osteoarthritis     knees    Stented coronary artery     2000       Past Surgical History   Past Surgical History:   Procedure Laterality Date    APPENDECTOMY OPEN N/A 12/2/2021    Procedure: APPENDECTOMY, OPEN;  Surgeon: Libertad Kyle MD;  Location: [facility] OR    ARTHROPLASTY KNEE Right 6/27/2017    Procedure: ARTHROPLASTY KNEE;  Right total knee arthroplasty;  Surgeon: Saqib Seaman MD;  Location: [facility] OR    CARDIAC SURGERY      stents cardiac 2000    EYE SURGERY      cataract surg right eye    HERNIORRHAPHY INGUINAL  5/31/2013    Procedure: HERNIORRHAPHY INGUINAL;  Right Inguinal Hernia Repair with Mesh;  Surgeon: Young Kemp MD;   Location:  OR       Prior to Admission Medications   Prior to Admission Medications   Prescriptions Last Dose Informant Patient Reported? Taking?   AMLODIPINE BESYLATE PO   Yes No   Sig: Take 10 mg by mouth daily   ASPIRIN PO   Yes No   Sig: Take 81 mg by mouth daily   COMPRESSION STOCKINGS   No No   Si each continuous   Clopidogrel Bisulfate (PLAVIX PO)   Yes No   Sig: Take 75 mg by mouth daily   LISINOPRIL PO   Yes No   Sig: Take 20 mg by mouth daily   PRAVASTATIN SODIUM PO   Yes No   Sig: Take 20 mg by mouth every evening    SENNA-docusate sodium (SENNA S) 8.6-50 MG tablet   No No   Sig: Take 1 tablet by mouth At Bedtime   metoprolol succinate ER (TOPROL-XL) 25 MG 24 hr tablet   Yes No   Sig: Take 25 mg by mouth 2 times daily   polyethylene glycol (MIRALAX) powder   No No   Sig: Take 17 g (1 capful) by mouth 2 times daily as needed for constipation      Facility-Administered Medications: None           Physical Exam   Vital Signs: Temp: 98.1  F (36.7  C)   BP: 180/82 Pulse: 64   Resp: 16 SpO2: 98 % O2 Device: None (Room air)    Weight: 0 lbs 0 oz        General: AOX3  HEENT: Head is atraumatic, normocephalic.  Pupils are equal, round and reactive to light.  No scleral icterus. Oral mucosa is moist   Neck: Neck is supple   Respiratory: Lungs are clear to auscultation bilaterally with no wheeze or crackles   Cardiovascular: Regular rate , S1 and S2 normal with no murmer or rubs or gallops  Abdomen:   soft , non tender , non distended and bowel sound present   Skin: No skin rashes or lesions  Neurologic: Higher functions are within normal limits. No obvious defects in speech, language and memory. No facial droop  Musculoskeletal: Normal Range of motion over upper and lower extremities bilaterally   Psychiatric: cooperative      Medical Decision Making       Time spent in care of patient is 78 minutes and I reviewed labs including CBC, comprehensive metabolic panel, troponin, INR, imaging including MRI of the  brain and discussed plan of care in detail with the patient, nursing staff, ED physician      Data     I have personally reviewed the following data over the past 24 hrs:    6.1  \   11.4 (L)   / 201     136 103 24.8 (H) /  109 (H)   4.4 26 1.36 (H) \     ALT: 13 AST: 16 AP: 69 TBILI: 0.6   ALB: 4.1 TOT PROTEIN: 6.8 LIPASE: N/A     Trop: 28 (H) BNP: N/A     INR:  1.07 PTT:  30   D-dimer:  N/A Fibrinogen:  N/A       Imaging results reviewed over the past 24 hrs:   Recent Results (from the past 24 hours)   MR Brain w/o & w Contrast    Narrative    EXAM: MR BRAIN W/O and W CONTRAST, MRA NECK (CAROTIDS) W/O and W CONTRAST, MRA BRAIN ([place] OF [place]) W/O CONTRAST  LOCATION: [facility]  DATE: 1/9/2025    INDICATION: aphasia, blurry vision, resolved  COMPARISON: None.  CONTRAST: 10 mL Gadavist  TECHNIQUE:   1) Routine multiplanar multisequence head MRI without and with intravenous contrast.  2) 3D time-of-flight head MRA without intravenous contrast.  3) Neck MRA without and with IV contrast. Stenosis measurements made according to NASCET criteria unless otherwise specified.    FINDINGS:  HEAD MRI:  INTRACRANIAL CONTENTS: No acute or subacute infarct. No mass, acute hemorrhage, or extra-axial fluid collections. Patchy nonspecific T2/FLAIR hyperintensities within the cerebral white matter and sae most consistent with mild to moderate chronic   microvascular ischemic change. Mild to moderate generalized cerebral atrophy. No hydrocephalus. Normal position of the cerebellar tonsils. No pathologic contrast enhancement.    SELLA: No abnormality accounting for technique.    OSSEOUS STRUCTURES/SOFT TISSUES: Normal marrow signal. The major intracranial vascular flow voids are maintained.     ORBITS: Prior right cataract surgery. Visualized portions of the orbits are otherwise unremarkable.     SINUSES/MASTOIDS: No paranasal sinus mucosal disease. No middle ear or mastoid effusion.     HEAD MRA:    ANTERIOR CIRCULATION: No stenosis/occlusion, aneurysm, or high flow vascular malformation. Fetal origin of the right posterior cerebral artery from the anterior circulation.    POSTERIOR CIRCULATION: No stenosis/occlusion, aneurysm, or high flow vascular malformation. Dominant left and smaller right vertebral artery contribute to a normal basilar artery.     NECK MRA:   RIGHT CAROTID: No measurable stenosis or dissection.    LEFT CAROTID: No measurable stenosis or dissection.    VERTEBRAL ARTERIES: No focal stenosis or dissection. Dominant left and smaller right vertebral arteries.    AORTIC ARCH: Classic aortic arch anatomy with no significant stenosis at the origin of the great vessels.      Impression    IMPRESSION:  HEAD MRI:   1.  No acute intracranial process.  2.  Generalized brain atrophy and presumed microvascular ischemic changes as detailed above.    HEAD MRA:   1.  No aneurysm, high flow AVM or significant stenosis identified.  2.  Variant [place] anatomy as above.    NECK MRA:  1.  Normal neck MRA.   MRA Angiogram Head w/o Contrast    Narrative    EXAM: MR BRAIN W/O and W CONTRAST, MRA NECK (CAROTIDS) W/O and W CONTRAST, MRA BRAIN ([place] OF [place]) W/O CONTRAST  LOCATION: [facility]  DATE: 1/9/2025    INDICATION: aphasia, blurry vision, resolved  COMPARISON: None.  CONTRAST: 10 mL Gadavist  TECHNIQUE:   1) Routine multiplanar multisequence head MRI without and with intravenous contrast.  2) 3D time-of-flight head MRA without intravenous contrast.  3) Neck MRA without and with IV contrast. Stenosis measurements made according to NASCET criteria unless otherwise specified.    FINDINGS:  HEAD MRI:  INTRACRANIAL CONTENTS: No acute or subacute infarct. No mass, acute hemorrhage, or extra-axial fluid collections. Patchy nonspecific T2/FLAIR hyperintensities within the cerebral white matter and sae most consistent with mild to moderate chronic   microvascular ischemic  change. Mild to moderate generalized cerebral atrophy. No hydrocephalus. Normal position of the cerebellar tonsils. No pathologic contrast enhancement.    SELLA: No abnormality accounting for technique.    OSSEOUS STRUCTURES/SOFT TISSUES: Normal marrow signal. The major intracranial vascular flow voids are maintained.     ORBITS: Prior right cataract surgery. Visualized portions of the orbits are otherwise unremarkable.     SINUSES/MASTOIDS: No paranasal sinus mucosal disease. No middle ear or mastoid effusion.     HEAD MRA:   ANTERIOR CIRCULATION: No stenosis/occlusion, aneurysm, or high flow vascular malformation. Fetal origin of the right posterior cerebral artery from the anterior circulation.    POSTERIOR CIRCULATION: No stenosis/occlusion, aneurysm, or high flow vascular malformation. Dominant left and smaller right vertebral artery contribute to a normal basilar artery.     NECK MRA:   RIGHT CAROTID: No measurable stenosis or dissection.    LEFT CAROTID: No measurable stenosis or dissection.    VERTEBRAL ARTERIES: No focal stenosis or dissection. Dominant left and smaller right vertebral arteries.    AORTIC ARCH: Classic aortic arch anatomy with no significant stenosis at the origin of the great vessels.      Impression    IMPRESSION:  HEAD MRI:   1.  No acute intracranial process.  2.  Generalized brain atrophy and presumed microvascular ischemic changes as detailed above.    HEAD MRA:   1.  No aneurysm, high flow AVM or significant stenosis identified.  2.  Variant [place] of [place] anatomy as above.    NECK MRA:  1.  Normal neck MRA.   MRA Angiogram Neck w/o & w Contrast    Narrative    EXAM: MR BRAIN W/O and W CONTRAST, MRA NECK (CAROTIDS) W/O and W CONTRAST, MRA BRAIN ([place] OF [place]) W/O CONTRAST  LOCATION: [facility]  DATE: 1/9/2025    INDICATION: aphasia, blurry vision, resolved  COMPARISON: None.  CONTRAST: 10 mL Gadavist  TECHNIQUE:   1) Routine multiplanar multisequence  head MRI without and with intravenous contrast.  2) 3D time-of-flight head MRA without intravenous contrast.  3) Neck MRA without and with IV contrast. Stenosis measurements made according to NASCET criteria unless otherwise specified.    FINDINGS:  HEAD MRI:  INTRACRANIAL CONTENTS: No acute or subacute infarct. No mass, acute hemorrhage, or extra-axial fluid collections. Patchy nonspecific T2/FLAIR hyperintensities within the cerebral white matter and sae most consistent with mild to moderate chronic   microvascular ischemic change. Mild to moderate generalized cerebral atrophy. No hydrocephalus. Normal position of the cerebellar tonsils. No pathologic contrast enhancement.    SELLA: No abnormality accounting for technique.    OSSEOUS STRUCTURES/SOFT TISSUES: Normal marrow signal. The major intracranial vascular flow voids are maintained.     ORBITS: Prior right cataract surgery. Visualized portions of the orbits are otherwise unremarkable.     SINUSES/MASTOIDS: No paranasal sinus mucosal disease. No middle ear or mastoid effusion.     HEAD MRA:   ANTERIOR CIRCULATION: No stenosis/occlusion, aneurysm, or high flow vascular malformation. Fetal origin of the right posterior cerebral artery from the anterior circulation.    POSTERIOR CIRCULATION: No stenosis/occlusion, aneurysm, or high flow vascular malformation. Dominant left and smaller right vertebral artery contribute to a normal basilar artery.     NECK MRA:   RIGHT CAROTID: No measurable stenosis or dissection.    LEFT CAROTID: No measurable stenosis or dissection.    VERTEBRAL ARTERIES: No focal stenosis or dissection. Dominant left and smaller right vertebral arteries.    AORTIC ARCH: Classic aortic arch anatomy with no significant stenosis at the origin of the great vessels.      Impression    IMPRESSION:  HEAD MRI:   1.  No acute intracranial process.  2.  Generalized brain atrophy and presumed microvascular ischemic changes as detailed above.    HEAD MRA:    1.  No aneurysm, high flow AVM or significant stenosis identified.  2.  Variant [place] of [place] anatomy as above.    NECK MRA:  1.  Normal neck MRA.

## 2025-01-09 NOTE — ED NOTES
"  [facility]    Stroke Telephone Note    I was called by Steven Moon on 01/09/25 regarding patient Mickey Donald. The patient is a 89 year old male with history of CAD on ASA 81, hypertension, and aortic stenosis presents w/ transient word finding difficulties, HA and blurry vision. Lasted for about an hour since around 0815, Back to baseline in ED without any deficits. /82, .       Vitals  BP: 180/82   Pulse: 64   Resp: 16   Temp: 98.1  F (36.7  C)        Imaging Findings  MRI brain was negative for any acute ischemic stroke    MRI head and neck negative for any large vessel occlusion    Impression    Transient aphasia/blurry vision      Recommendations  - Neurochecks and Vital Signs every 4 hrs   -  Plavix 300mg now  Aspirin 81mg and Plavix 75mg x3 weeks  --then stop Plavix and continue Aspirin 81mg alone  - 24-hour Telemetry  - Bedside Glucose Monitoring  - A1c, Lipid Panel  - PT/OT/SLP  - Stroke Education  - Euthermia, Euglycemia  -TTE    My recommendations are based on the information provided over the phone by Mickey Donald's in-person providers. They are not intended to replace the clinical judgment of his in-person providers. I was not requested to personally see or examine the patient at this time.     The Stroke Staff is Dr. Green.    Carl Thurston MD  Vascular Neurology Fellow    To page me or covering stroke neurology team member, click here: [email or web address]  Choose \"On Call\" tab at top, then select \"NEUROLOGY/ALL SITES\" from middle drop-down box, press Enter, then look for \"stroke\" or \"telestroke\" for your site.   "

## 2025-01-09 NOTE — ED TRIAGE NOTES
Patient reports garbled speech this morning at 9am for about an hour and a half. Had trouble with his vision this morning as well when trying to read his bible.    Patient now alert and oriented, BEFAST negative in triage.    States he began to have a headache last night. Does not take any blood thinner medication.

## 2025-01-09 NOTE — ED PROVIDER NOTES
Emergency Department Note      History of Present Illness     Chief Complaint   Altered Mental Status    HPI   Mickey Donald is a 89 year old male with a history of CAD, hypertension, and aortic stenosis who presents to the ED with and his daughter for evaluation of altered mental status. Mickey reports that he had a headache early this morning around [id number]. It was right sided and has gradually improved since.He does not often have headaches. Mickey woke up this morning around 0800, had breakfast and went to read his bible, and that's when he realized he couldn't read it.The words were blurry and did not make sense to him. Mickey then had a hard time speaking to his friend around 0900 when his friend called him, which he describes as being a word salad. Mickey's difficulty speaking persisted until approximately 1000. Mickey's daughter reports that her  who visited him at 1000 did not believe Mickey was acting himself at that time either. He denies chest pain or shortness of breath. No blood thinner use. His daughter reports that at the time of obtaining the HPI, Mickey is back to normal, and Mickey also reports feeling back to normal.    Independent Historian   Daughter as detailed above.    Review of External Notes   I reviewed the patient's H&P from [facility] from 12/01/2021.    Past Medical History     Medical History and Problem List   Acute appendicitis   Aortic stenosis   Carotid bruit   Chronotropic incompetence   Coronary artery disease  Degenerative arthritis   Dyslipidemia   Hypertension  Osteoarthritis  PVCs  Stented coronary artery  Unstable angina     Medications   Aspirin 81 mg   Flexeril   Nitrostat   Zestril   Pravachol   Toprol   Plavix     Surgical History   Past Surgical History:   Procedure Laterality Date    APPENDECTOMY OPEN N/A 12/2/2021    Procedure: APPENDECTOMY, OPEN;  Surgeon: Libertad Kyle MD;  Location: [facility] OR    ARTHROPLASTY KNEE Right 6/27/2017    Procedure:  ARTHROPLASTY KNEE;  Right total knee arthroplasty;  Surgeon: Saqib Seaman MD;  Location:  OR    CARDIAC SURGERY      stents cardiac 2000    EYE SURGERY      cataract surg right eye    HERNIORRHAPHY INGUINAL  5/31/2013    Procedure: HERNIORRHAPHY INGUINAL;  Right Inguinal Hernia Repair with Mesh;  Surgeon: Young Kemp MD;  Location:  OR     Physical Exam     Patient Vitals for the past 24 hrs:   BP Temp Pulse Resp SpO2   01/09/25 1739 190/87 -- 79 18 99 %   01/09/25 1127 180/82 98.1  F (36.7  C) 64 16 98 %     Physical Exam  Nursing note and vitals reviewed.  Constitutional:  Oriented to person, place, and time. Vital signs are normal. Cooperative.   HENT:   Mouth/Throat:   Oropharynx is clear and moist and mucous membranes are normal.   Eyes:    Conjunctivae are normal.      Pupils are equal, round, and reactive to light.   Neck:    Trachea normal and normal range of motion.   Cardiovascular:  Normal rate, regular rhythm and normal heart sounds.    Pulses:   Radial pulses are 2+ bilaterally. Dorsalis pedis pulses are 2+ bilaterally.   Pulmonary/Chest:  Effort normal.   Abdominal:   Soft. Normal appearance and bowel sounds are normal.      Exhibits no distension. There is no tenderness.      There is no rebound and no CVA tenderness.   Musculoskeletal:  Extremities atraumatic x 4.   Lymphadenopathy:  No cervical adenopathy.   Neurological:   Alert and oriented to person, place, and time. Normal strength and normal reflexes. Not disoriented. No cranial nerve deficit or sensory      deficit. Displays a negative Romberg sign. Coordination and gait normal. GCS eye subscore is 4. GCS verbal subscore is 5. GCS motor      subscore is 6. Visual fields intact. No pronator drift. Normal Finger-to-Nose and Rapid Alternating Movement.   Skin:    Skin is warm, dry and intact.      No rash noted.   Psychiatric:   Normal mood and affect. Speech is normal and behavior is normal. Judgment normal. Cognition and memory are  normal.    Diagnostics     Lab Results   Labs Ordered and Resulted from Time of ED Arrival to Time of ED Departure   COMPREHENSIVE METABOLIC PANEL - Abnormal       Result Value    Sodium 136      Potassium 4.4      Carbon Dioxide (CO2) 26      Anion Gap 7      Urea Nitrogen 24.8 (*)     Creatinine 1.36 (*)     GFR Estimate 49 (*)     Calcium 9.4      Chloride 103      Glucose 109 (*)     Alkaline Phosphatase 69      AST 16      ALT 13      Protein Total 6.8      Albumin 4.1      Bilirubin Total 0.6     CBC WITH PLATELETS AND DIFFERENTIAL - Abnormal    WBC Count 6.1      RBC Count 3.85 (*)     Hemoglobin 11.4 (*)     Hematocrit 34.9 (*)     MCV 91      MCH 29.6      MCHC 32.7      RDW 13.2      Platelet Count 201      % Neutrophils 69      % Lymphocytes 20      % Monocytes 8      % Eosinophils 3      % Basophils 1      % Immature Granulocytes 0      NRBCs per 100 WBC 0      Absolute Neutrophils 4.2      Absolute Lymphocytes 1.2      Absolute Monocytes 0.5      Absolute Eosinophils 0.2      Absolute Basophils 0.0      Absolute Immature Granulocytes 0.0      Absolute NRBCs 0.0     TROPONIN T, HIGH SENSITIVITY - Abnormal    Troponin T, High Sensitivity 28 (*)    INR - Normal    INR 1.07     PARTIAL THROMBOPLASTIN TIME - Normal    aPTT 30     TROPONIN T, HIGH SENSITIVITY     Imaging   MR Brain w/o & w Contrast   Final Result   IMPRESSION:   HEAD MRI:    1.  No acute intracranial process.   2.  Generalized brain atrophy and presumed microvascular ischemic changes as detailed above.      HEAD MRA:    1.  No aneurysm, high flow AVM or significant stenosis identified.   2.  Variant [place] anatomy as above.      NECK MRA:   1.  Normal neck MRA.      MRA Angiogram Head w/o Contrast   Final Result   IMPRESSION:   HEAD MRI:    1.  No acute intracranial process.   2.  Generalized brain atrophy and presumed microvascular ischemic changes as detailed above.      HEAD MRA:    1.  No aneurysm, high flow AVM or significant  stenosis identified.   2.  Variant [place] anatomy as above.      NECK MRA:   1.  Normal neck MRA.      MRA Angiogram Neck w/o & w Contrast   Final Result   IMPRESSION:   HEAD MRI:    1.  No acute intracranial process.   2.  Generalized brain atrophy and presumed microvascular ischemic changes as detailed above.      HEAD MRA:    1.  No aneurysm, high flow AVM or significant stenosis identified.   2.  Variant [place] anatomy as above.      NECK MRA:   1.  Normal neck MRA.        EKG   ECG taken at 1231, ECG read at 1245  Sinus rhythm with 1st degree AV block  Minimal voltage criteria for LVH, may be normal variant ([place] product)  Anterior infarct, age undetermined   No significant changes as compared to prior, dated 12/01/2021.  Rate 70 bpm. MA interval 244 ms. QRS duration 116 ms. QT/QTc 402/434 ms. P-R-T axes 52 -22 59.    Independent Interpretation   None    ED Course      Medications Administered   Medications   labetalol (NORMODYNE/TRANDATE) injection 10-20 mg (has no administration in time range)     Or   hydrALAZINE (APRESOLINE) injection 10-20 mg (has no administration in time range)   gadobutrol (GADAVIST) injection 10 mL (10 mLs Intravenous $Given 1/9/25 1433)   aspirin (ASA) chewable tablet 81 mg (81 mg Oral $Given 1/9/25 1737)   clopidogrel (PLAVIX) tablet 300 mg (300 mg Oral $Given 1/9/25 1737)     Procedures   Procedures     Discussion of Management   Admitting Hospitalist, Dr. Morgan    ED Course   ED Course as of 01/09/25 1713   Thu Jan 09, 2025   1217 I obtained history and examined the patient as noted above.    1238 I spoke to stroke/neuro, Dr. Thurston, regarding the patient's symptoms and current status in the ED. We discussed a plan for the patient.     Additional Documentation  None    Medical Decision Making / Diagnosis     CMS Diagnoses: None    MIPS       None    MDM   Mickey Donald is a 89 year old male who came in for further evaluation of what sounds like a TIA.   All of his symptoms have supposedly resolved at this point, which is reassuring.  This is why I did not call a code stroke.  However I did speak with the stroke neurology fellow, Dr. Thurston, who recommended proceeding directly to MRI rather than doing CT scans.  I also spoke with him again after the patient's MRIs resulted, and he and his staff are concerned for this being a high risk TIA.  They recommended loading with aspirin and Plavix and bringing him into the hospital for further evaluation and management.  Therefore I subsequently spoke with Dr. Morgan who will be taking care of him.    Disposition   The patient was admitted to the hospital.     Diagnosis     ICD-10-CM    1. TIA (transient ischemic attack)  G45.9          I, Estefani Cummins, am serving as a scribe at 12:10 PM on 1/9/2025 to document services personally performed by Steven Moon MD based on my observations and the provider's statements to me.        Steven Moon MD  01/09/25 1817

## 2025-01-09 NOTE — ED NOTES
Bed: TR02  Expected date: 1/9/25  Expected time: 11:12 AM  Means of arrival:   Comments:  Patient in room

## 2025-01-10 ENCOUNTER — ORDERS ONLY (AUTO-RELEASED) (OUTPATIENT)
Dept: MEDSURG UNIT | Facility: CLINIC | Age: OVER 89
End: 2025-01-10

## 2025-01-10 ENCOUNTER — APPOINTMENT (OUTPATIENT)
Dept: CARDIOLOGY | Facility: CLINIC | Age: OVER 89
End: 2025-01-10
Attending: PHYSICIAN ASSISTANT
Payer: COMMERCIAL

## 2025-01-10 ENCOUNTER — APPOINTMENT (OUTPATIENT)
Dept: PHYSICAL THERAPY | Facility: CLINIC | Age: OVER 89
End: 2025-01-10
Attending: HOSPITALIST
Payer: COMMERCIAL

## 2025-01-10 VITALS
OXYGEN SATURATION: 99 % | TEMPERATURE: 97.9 F | RESPIRATION RATE: 16 BRPM | SYSTOLIC BLOOD PRESSURE: 156 MMHG | DIASTOLIC BLOOD PRESSURE: 82 MMHG | HEART RATE: 68 BPM

## 2025-01-10 DIAGNOSIS — G45.9 TIA (TRANSIENT ISCHEMIC ATTACK): ICD-10-CM

## 2025-01-10 LAB
ANION GAP SERPL CALCULATED.3IONS-SCNC: 12 MMOL/L (ref 7–15)
BUN SERPL-MCNC: 21 MG/DL (ref 8–23)
CALCIUM SERPL-MCNC: 9.7 MG/DL (ref 8.8–10.4)
CHLORIDE SERPL-SCNC: 103 MMOL/L (ref 98–107)
CREAT SERPL-MCNC: 1.22 MG/DL (ref 0.67–1.17)
EGFRCR SERPLBLD CKD-EPI 2021: 55 ML/MIN/1.73M2
ERYTHROCYTE [DISTWIDTH] IN BLOOD BY AUTOMATED COUNT: 13.2 % (ref 10–15)
GLUCOSE BLDC GLUCOMTR-MCNC: 104 MG/DL (ref 70–99)
GLUCOSE BLDC GLUCOMTR-MCNC: 94 MG/DL (ref 70–99)
GLUCOSE SERPL-MCNC: 105 MG/DL (ref 70–99)
HCO3 SERPL-SCNC: 24 MMOL/L (ref 22–29)
HCT VFR BLD AUTO: 38.1 % (ref 40–53)
HGB BLD-MCNC: 12.7 G/DL (ref 13.3–17.7)
LVEF ECHO: NORMAL
MCH RBC QN AUTO: 30.5 PG (ref 26.5–33)
MCHC RBC AUTO-ENTMCNC: 33.3 G/DL (ref 31.5–36.5)
MCV RBC AUTO: 91 FL (ref 78–100)
PLATELET # BLD AUTO: 183 10E3/UL (ref 150–450)
POTASSIUM SERPL-SCNC: 4.5 MMOL/L (ref 3.4–5.3)
RBC # BLD AUTO: 4.17 10E6/UL (ref 4.4–5.9)
SODIUM SERPL-SCNC: 139 MMOL/L (ref 135–145)
TROPONIN T SERPL HS-MCNC: 26 NG/L
TROPONIN T SERPL HS-MCNC: 26 NG/L
WBC # BLD AUTO: 5.3 10E3/UL (ref 4–11)

## 2025-01-10 PROCEDURE — G0378 HOSPITAL OBSERVATION PER HR: HCPCS

## 2025-01-10 PROCEDURE — 93306 TTE W/DOPPLER COMPLETE: CPT | Mod: 26 | Performed by: INTERNAL MEDICINE

## 2025-01-10 PROCEDURE — 255N000002 HC RX 255 OP 636: Performed by: PHYSICIAN ASSISTANT

## 2025-01-10 PROCEDURE — 99221 1ST HOSP IP/OBS SF/LOW 40: CPT | Mod: GC | Performed by: PSYCHIATRY & NEUROLOGY

## 2025-01-10 PROCEDURE — 250N000013 HC RX MED GY IP 250 OP 250 PS 637: Performed by: HOSPITALIST

## 2025-01-10 PROCEDURE — 99239 HOSP IP/OBS DSCHRG MGMT >30: CPT | Performed by: PHYSICIAN ASSISTANT

## 2025-01-10 PROCEDURE — 250N000013 HC RX MED GY IP 250 OP 250 PS 637: Performed by: PHYSICIAN ASSISTANT

## 2025-01-10 PROCEDURE — 97161 PT EVAL LOW COMPLEX 20 MIN: CPT | Mod: GP | Performed by: PHYSICAL THERAPIST

## 2025-01-10 PROCEDURE — 84484 ASSAY OF TROPONIN QUANT: CPT | Performed by: PHYSICIAN ASSISTANT

## 2025-01-10 PROCEDURE — 82962 GLUCOSE BLOOD TEST: CPT

## 2025-01-10 PROCEDURE — 80048 BASIC METABOLIC PNL TOTAL CA: CPT | Performed by: PHYSICIAN ASSISTANT

## 2025-01-10 PROCEDURE — 36415 COLL VENOUS BLD VENIPUNCTURE: CPT | Performed by: PHYSICIAN ASSISTANT

## 2025-01-10 PROCEDURE — 97116 GAIT TRAINING THERAPY: CPT | Mod: GP | Performed by: PHYSICAL THERAPIST

## 2025-01-10 PROCEDURE — 999N000111 HC STATISTIC OT IP EVAL DEFER

## 2025-01-10 PROCEDURE — C8929 TTE W OR WO FOL WCON,DOPPLER: HCPCS

## 2025-01-10 PROCEDURE — 85014 HEMATOCRIT: CPT | Performed by: PHYSICIAN ASSISTANT

## 2025-01-10 PROCEDURE — 999N000208 ECHOCARDIOGRAM COMPLETE

## 2025-01-10 PROCEDURE — 999N000226 HC STATISTIC SLP IP EVAL DEFER: Performed by: SPEECH-LANGUAGE PATHOLOGIST

## 2025-01-10 RX ORDER — PRAVASTATIN SODIUM 20 MG
20 TABLET ORAL AT BEDTIME
Qty: 30 TABLET | Refills: 0 | Status: SHIPPED | OUTPATIENT
Start: 2025-01-10

## 2025-01-10 RX ORDER — METOPROLOL SUCCINATE 25 MG/1
25 TABLET, EXTENDED RELEASE ORAL 2 TIMES DAILY
Status: DISCONTINUED | OUTPATIENT
Start: 2025-01-10 | End: 2025-01-10 | Stop reason: HOSPADM

## 2025-01-10 RX ORDER — CLOPIDOGREL BISULFATE 75 MG/1
75 TABLET ORAL DAILY
Qty: 21 TABLET | Refills: 0 | Status: SHIPPED | OUTPATIENT
Start: 2025-01-11

## 2025-01-10 RX ADMIN — METOPROLOL SUCCINATE 25 MG: 25 TABLET, EXTENDED RELEASE ORAL at 09:28

## 2025-01-10 RX ADMIN — CLOPIDOGREL BISULFATE 75 MG: 75 TABLET ORAL at 09:28

## 2025-01-10 RX ADMIN — PERFLUTREN 10 ML: 6.52 INJECTION, SUSPENSION INTRAVENOUS at 11:54

## 2025-01-10 RX ADMIN — ASPIRIN 81 MG: 81 TABLET, COATED ORAL at 09:28

## 2025-01-10 ASSESSMENT — ACTIVITIES OF DAILY LIVING (ADL)
ADLS_ACUITY_SCORE: 49
ADLS_ACUITY_SCORE: 58
ADLS_ACUITY_SCORE: 58
ADLS_ACUITY_SCORE: 49
DEPENDENT_IADLS:: INDEPENDENT
ADLS_ACUITY_SCORE: 58
ADLS_ACUITY_SCORE: 49
ADLS_ACUITY_SCORE: 49
ADLS_ACUITY_SCORE: 58
ADLS_ACUITY_SCORE: 49
ADLS_ACUITY_SCORE: 49
ADLS_ACUITY_SCORE: 58
ADLS_ACUITY_SCORE: 58
ADLS_ACUITY_SCORE: 57
ADLS_ACUITY_SCORE: 57

## 2025-01-10 NOTE — PROGRESS NOTES
RECEIVING UNIT ED HANDOFF REVIEW    ED Nurse Handoff Report was reviewed by: Corinne Agosto RN on January 9, 2025 at 8:32 PM

## 2025-01-10 NOTE — PLAN OF CARE
OT: Order received, chart reviewed and discussed with care team. OT not indicated due to pt functioning near baseline, observed ambulating w/ physical therapy no device and completed stairs, no ADL deficits identifed. Defer discharge recommendations to current care team. Will complete orders.

## 2025-01-10 NOTE — PLAN OF CARE
Pt discharged home with adan. PIV removed. Stroke education provided. Discharge meds given. Will follow up with PCP and stroke neuro team.

## 2025-01-10 NOTE — DISCHARGE INSTRUCTIONS
HEALTHCARE DIRECTIVE:   For information on completing a healthcare directive (all adults should complete one) please visit [email or web address]   A direct link to the forms you can print and guides for how to complete them can be found at: [email or web address]     To make it valid, please either 1) have it signed by 2 witnesses (cannot be a named surrogate) or 2) have it signed by a notary.  Then make copies to provide to your Primary Care Provider and any other care providers.    You are scheduled for a hospitalization follow-up on Thursday, January 16th at 1:25pm, with Dr Matos at :   [facility]      [address], [place], [place] [id number] [phone]  If you need to change or cancel this appointment, please call the clinic directly.

## 2025-01-10 NOTE — PLAN OF CARE
Pt here with TIA. A&Ox4. Neuros are stable. VSS. Tele SR. Regular diet, thin liquids. Takes pills whole. Up with SBA. Continent of B&B. Denies pain. Pt scoring green on the Aggression Stop Light Tool. Discharge pending.

## 2025-01-10 NOTE — CONSULTS
[facility]    Stroke Consult Note    Reason for Consult:  TIA    Chief Complaint: Altered Mental Status       HPI  Mickey Donald is a 89 year old male with past medical history significant for coronary artery disease s/p PCI, hypertension, hyperlipidemia, CKD and aortic stenosis s/p AVR presents with transient aphasia and blurry vision.      Mickey states that yesterday he woke up at around 2 AM to use the restroom when he noticed right frontal and temporal headache which is unusual for him, he went back to bed woke up fine, at around 8 AM he noticed that the words were broken up and hazy when he was reading.  Shortly after his friend called him over phone but he could not speak to him because he could not get the right words out, he states that he had it in his mind.  He states that it started to improve after his son-in-law came who also noticed that he was a bit off and he was wearing his hat and coat. He denied any focal weakness, numbness, paresthesia, recent illness, prior similar events, TIAs or ischemic strokes.  He endorses having a remote event of acute onset dizziness many years ago but was not similar to this.  He admits to missing his aspirin doses for the past couple of days as he ran out of it.  He denies smoking, EtOH or illicit drug use.  He is pretty independent with his ADLs at baseline and does not need any ambulatory aids to get around out.       On admission, /82, EKG showing normal sinus rhythm, MRI brain was negative for any acute ischemic stroke, MRA head and neck was negative for any large vessel occlusion or any hemodynamic significant stenosis. LDL 75, A1c at goal.  He was started on dual antiplatelet therapy.     Stroke Evaluation Summarized    MRI/Head CT MRI brain was negative for any acute ischemic stroke   Intracranial Vasculature MRA head and neck was negative for any large vessel occlusion or any hemodynamic significant stenosis    Cervical Vasculature MRA head and neck was negative for any large vessel occlusion or any hemodynamic significant stenosis     Echocardiogram Left ventricular systolic function is normal.The visual ejection fraction is  55-60%.  The right ventricular systolic function is normal.  Status post 29-mm [place] 3 aortic bioprosthetic valveThe gradient is  normal for this prosthetic aortic valve.No aortic regurgitation is present.  IVC diameter <2.1 cm collapsing >50% with sniff suggests a normal RA pressure  of 3 mmHg.   EKG/Telemetry NSR   Other Testing Not Applicable      LDL  1/9/2025: 75 mg/dL   A1C  1/9/2025: 6.2 %   Troponin 1/10/2025: 26 ng/L       ABCD2 score 4    Impression    Transient ischemic attack  Transient aphasia      89 year old male with past medical history significant for coronary artery disease s/p PCI, hypertension, hyperlipidemia, CKD and aortic stenosis s/p AVR presents with transient aphasia and blurry vision. On admission, /82, EKG showing normal sinus rhythm, MRI brain was negative for any acute ischemic stroke, MRA head and neck was negative for any large vessel occlusion or any hemodynamic significant stenosis. LDL 75, A1c at goal.  He was started on dual antiplatelet therapy. Suspect transient speech difficulties is likely a TIA, inpatient TIA work up has been unrevealing so far, he would benefit from external cardiac monitoring for any occult AF.     Recommendations   - Neurochecks and Vital Signs every 4 hrs  Aspirin 81mg and Plavix 75mg x3 weeks  --then stop Plavix and continue Aspirin 81mg alone  - Statin: Continue home pravastatin 20 mg daily  - 24-hour Telemetry  - Bedside Glucose Monitoring  - PT/OT/SLP  - Stroke Education  - Euthermia, Euglycemia      Patient Follow-up    - in 5-6 weeks with general neurology or stroke [facility] ([phone])    Thank you for this consult. No further stroke evaluation is recommended, so we will sign off. Please contact us with any  "additional questions.    The Stroke Staff is Dr. Green.    Carl Thurston MD  Vascular Neurology Fellow    To page me or covering stroke neurology team member, click here: [facility]  Choose \"On Call\" tab at top, then select \"NEUROLOGY/ALL SITES\" from middle drop-down box, press Enter, then look for \"stroke\" or \"telestroke\" for your site.  _____________________________________________________    Clinically Significant Risk Factors Present on Admission                 # Drug Induced Platelet Defect: home medication list includes an antiplatelet medication   # Hypertension: Noted on problem list               # Financial/Environmental Concerns: none           Past Medical History    Past Medical History:   Diagnosis Date    Coronary artery disease     Hypertension     Osteoarthritis     knees    Stented coronary artery     2000     Medications   Home Meds  Prior to Admission medications    Medication Sig Start Date End Date Taking? Authorizing Provider   amLODIPine (NORVASC) 10 MG tablet Take 10 mg by mouth daily. 5/15/24  Yes Unknown, Entered By History   aspirin 81 MG EC tablet Take 81 mg by mouth daily.   Yes Unknown, Entered By History   clobetasol propionate (TEMOVATE) 0.05 % external cream Apply topically 2 times daily as needed (dermatitis). 4/12/24  Yes Unknown, Entered By History   clopidogrel (PLAVIX) 75 MG tablet Take 1 tablet (75 mg) by mouth daily. 1/11/25  Yes Candie Wagner PA-C   lisinopril (ZESTRIL) 30 MG tablet Take 30 mg by mouth at bedtime. 12/11/24  Yes Unknown, Entered By History   metoprolol succinate ER (TOPROL-XL) 25 MG 24 hr tablet Take 25 mg by mouth 2 times daily   Yes Unknown, Entered By History   pravastatin (PRAVACHOL) 20 MG tablet Take 1 tablet (20 mg) by mouth at bedtime. 1/10/25  Yes Candie Wagner PA-C   COMPRESSION STOCKINGS 1 each continuous 6/29/17   Andrea Christensen PA-C       Scheduled Meds  Current Facility-Administered Medications   Medication " Dose Route Frequency Provider Last Rate Last Admin    aspirin EC tablet 81 mg  81 mg Oral Daily Sameer Morgan MD   81 mg at 01/10/25 0928    clopidogrel (PLAVIX) tablet 75 mg  75 mg Oral Daily Candie Wagner PA-C   75 mg at 01/10/25 0928    metoprolol succinate ER (TOPROL XL) 24 hr tablet 25 mg  25 mg Oral BID Candie Wagner PA-C   25 mg at 01/10/25 0928    pravastatin (PRAVACHOL) tablet 20 mg  20 mg Oral At Bedtime Sameer Morgan MD   20 mg at 01/09/25 2141    sodium chloride (PF) 0.9% PF flush 3 mL  3 mL Intracatheter Q8H Candie Wagner PA-C   3 mL at 01/09/25 2142       Infusion Meds  Current Facility-Administered Medications   Medication Dose Route Frequency Provider Last Rate Last Admin       Allergies   No Known Allergies       PHYSICAL EXAMINATION   Temp:  [97.8  F (36.6  C)-98  F (36.7  C)] 97.9  F (36.6  C)  Pulse:  [69-79] 75  Resp:  [16-18] 16  BP: (149-209)/(73-98) 168/73  SpO2:  [96 %-99 %] 99 %    Neurologic  Mental Status:  alert, oriented x 3, follows commands, speech clear and fluent, naming and repetition normal, able to spell the word world but unable to spell backwards, able to subtract serial sevens.  He is able to tell the months backward.   Cranial Nerves:  visual fields intact, PERRL, EOMI with normal smooth pursuit, facial sensation intact and symmetric, left facial asymmetry, hearing not formally tested but intact to conversation,, shoulder shrug strong bilaterally, tongue protrusion midline  Motor:  normal muscle tone and bulk, no abnormal movements, able to move all limbs spontaneously, strength 5/5 throughout upper and lower extremities, no pronator drift  Reflexes:  toes down-going  Sensory:  light touch sensation intact and symmetric throughout upper and lower extremities, no extinction on double simultaneous stimulation   Coordination:  normal finger-to-nose and heel-to-shin bilaterally without dysmetria, rapid alternating movements  symmetric  Station/Gait:  deferred    Stroke Scales    NIHSS  1a. Level of Consciousness  0   1b. LOC Questions  0   1c. LOC Commands  0   2.   Best Gaze  0   3.   Visual  0   4.   Facial Palsy  0   5a. Motor Arm, Left  0   5b. Motor Arm, Right  0   6a. Motor Leg, Left  0   6b. Motor Leg, right  0   7.   Limb Ataxia  0   8.   Sensory  0   9.   Best Language  0   10. Dysarthria  0   11. Extinction and Inattention   0   Total (0)       Imaging  I personally reviewed all imaging; relevant findings per HPI.    Labs Data   CBC  Recent Labs   Lab 01/10/25  0939 01/09/25  1130   WBC 5.3 6.1   RBC 4.17* 3.85*   HGB 12.7* 11.4*   HCT 38.1* 34.9*    201     Basic Metabolic Panel   Recent Labs   Lab 01/10/25  1130 01/10/25  0939 01/10/25  0806 01/09/25  1130   NA  --  139  --  136   POTASSIUM  --  4.5  --  4.4   CHLORIDE  --  103  --  103   CO2  --  24  --  26   BUN  --  21.0  --  24.8*   CR  --  1.22*  --  1.36*   * 105* 94 109*   KEYONNA  --  9.7  --  9.4     Liver Panel  Recent Labs   Lab 01/09/25  1130   PROTTOTAL 6.8   ALBUMIN 4.1   BILITOTAL 0.6   ALKPHOS 69   AST 16   ALT 13     INR    Recent Labs   Lab Test 01/09/25  1130 06/30/17  0638 06/29/17  0644   INR 1.07 1.05 1.13           Stroke Consult Data Data   This was a non-emergent, non-telestroke consult.

## 2025-01-10 NOTE — DISCHARGE SUMMARY
[facility]  Hospitalist Discharge Summary      Date of Admission:  1/9/2025  Date of Discharge:  1/10/2025  Discharging Provider: Candie Wagner PA-C  Discharge Service: Hospitalist Service    Discharge Diagnoses   Transient aphasia and blurred vision, suspect TIA  Elevated troponin, flat, demand in the setting of above   CAD s/p PCI to  LAD, RCA and circumflex (last in 2000)  Aortic stenosis S/P TAVR (2/2018)  Hx of high PVC burden on BB   Hypertension  Hyperlipidemia  CKD stage IIIa  Chronic normocytic anemia  Prediabetes    Clinically Significant Risk Factors          Follow-ups Needed After Discharge   Follow-up Appointments       Hospital Follow-up with Existing Primary Care Provider (PCP)      Please see details below         Schedule Primary Care visit within: 7 Days             Unresulted Labs Ordered in the Past 30 Days of this Admission       No orders found for last 31 day(s).        These results will be followed up by PCP    Discharge Disposition   Discharged to home  Condition at discharge: Stable    Hospital Course   Mickey Donald is a 89 year old male who has medical history which includes coronary artery disease s/p stenting in 2000, hypertension, hyperlipidemia, CKD stage III, and chronic anemia who presented to the ED for further evaluation of garbled speech noted ~0900 with associated vision change (difficulty reading his bible). Self resolved, but presented to the ED for further evaluation and treatment of suspected TIA.     Completed stroke work-up 1/10. No recurrent neuro deficits. Pt felt at baseline and daughter, Lidia, confirmed this. Pt in agreement with discharge home.      Transient aphasia and blurred vision, suspect TIA: Reported headache ~9346-5004 the AM of admission, right sided, gradually improved. He does not often have headaches. He awoke ~0800, had breakfast and went to read his bible, and that's when he realized he couldn't read  "it.The words were blurry and did not make sense to him. Mickey then had a hard time speaking to his friend around 0900 when his friend called him, which he describes as being a \"word salad.\" Mickey's difficulty speaking persisted until approximately 1000. Mickey's daughter reports that her  who visited him at 1000 did not believe Mickey was acting himself at that time either. He denies chest pain or shortness of breath. No blood thinner use. His daughter reports that at the time of obtaining the HPI in the ED, Mickey is back to normal, and Mickey also reports feeling back to normal.   *CMP with creatinine of 1.36, otherwise unremarkable. CBC with chronic anemia.   *Lipid profile 149/75/55/94. A1C 6.2.   *MRI, head and neck MRA negative for acute pathology   *Pt received Plavix 300 mg X1 and ASA 81 mg X1 in the ED  *Echo with preserved EF, known bioprosthetic aortic valve. Telemetry with NSR throughout stay.   - Stroke Neurology following, plan for ASA 81 mg po every day + Plavix 75 mg po every day X3 weeks followed by resumption of ASA 81 mg po every day monotherapy   - Continue pravastatin 20 mg po every day, rx sent with pt for 30 day supply as pt reports being out for the past few days.   - PT recommend discharge home   - Follow-up with Neurology in 5-6 weeks   - PCP follow-up in 1-2 weeks for hospital follow-up   - Return to the ER with recurrent sx or any new neuro sx.      Elevated troponin, flat, demand in the setting of above : 28 on admission>26>26. EKG with NSR, first degree AV block. Tele with NSR. No cardiac sx reported. Echo unremarkable as above.   - No further work-up indicated      CAD s/p PCI to  LAD, RCA and circumflex (last in 2000)  Aortic stenosis S/P TAVR (2/2018)  Hx of high PVC burden on BB   Hypertension  Hyperlipidemia: Follows with I, last visit 5/3/2023.   *Angiography pre-TAVR showed widely patent stent without ISR   [Pravastatin 20 mg po every day, Toprol XL 25 mg BID, " Lisinopril 30 mg po every day, Norvasc 10 mg po every day, ASA 81 mg po every day]  -Resume all antihypertensives at discharge      CKD stage IIIa: Baseline creatinine 1.2-1.5 per CareEverywhere. Stable at 1.36 on admission.   - Monitor       Chronic normocytic anemia: Baseline Hgb 11-12 per CareEverywhere. No active signs of bleeding.   - Monitor          Recent Labs   Lab 01/09/25  1130   HGB 11.4*      Prediabetes: A1C 6.2. Noted.  - PCP can monitor, no indication for medication initiation     Consultations This Hospital Stay   NEUROLOGY IP STROKE CONSULT  SPEECH LANGUAGE PATH ADULT IP CONSULT  PHARMACY IP CONSULT  PHARMACY IP CONSULT  PHARMACY IP CONSULT  PHYSICAL THERAPY ADULT IP CONSULT  OCCUPATIONAL THERAPY ADULT IP CONSULT  REHAB ADMISSIONS LIAISON IP CONSULT  CARE MANAGEMENT / SOCIAL WORK IP CONSULT  SMOKING CESSATION PROGRAM IP CONSULT    Code Status   Full Code    Time Spent on this Encounter   I, Candie Wagner PA-C, personally saw the patient today and spent greater than 30 minutes discharging this patient.    Case discussed with Dr. Murillo who agrees with the above. Updated daughter, Lidia, at bedside.        Candie Wagner PA-C  [facility] NEUROSCIENCE UNIT  [address] [place] [address]  Phone: [phone]  ______________________________________________________________________    Physical Exam   Vital Signs: Temp: 98  F (36.7  C) Temp src: Oral BP: 149/75 Pulse: 69   Resp: 16 SpO2: 96 % O2 Device: None (Room air)    Weight: 0 lbs 0 oz    CONSTITUTIONAL: Pt laying in bed, dressed in hospital garb. Appears comfortable. Cooperative with interview. Accompanied by daughter, Lidia.   HEENT: Normocephalic, atraumatic. Pupils equal, round, and reactive to light. Negative for conjunctival redness or scleral icterus.  EOMI, bilat. Oral mucosa pink and moist; negative for ulcerations, erythema, or exudates.  Chronic scarring L TM. No cerumen bilat  or signs of infection in bilateral tympanic membranes.   CARDIOVASCULAR: RRR, no murmurs, rubs, or extra heart sounds appreciated. Pulses +2/4 and regular in upper and lower extremities, bilaterally.   RESPIRATORY: No increased work of breathing. CTA, bilat; no wheezes, rales, or rhonchi appreciated.  GASTROINTESTINAL:  Abdomen soft, non-distended. BS auscultated in all four quadrants. Negative for tenderness to palpation.    MUSCULOSKELETAL: Strength +5/5 in upper and lower extremities, bilaterally. No gross deformities noted. Normal muscle tone.   HEMATOLOGIC/LYMPHATIC/IMMUNOLOGIC: Negative for lower extremity edema, bilaterally.  NEUROLOGIC: Alert and oriented to person, place, and time.  strength intact. CN's II-XII grossly intact. Cerebellar function intact per finger to nose intact in UE bilat. Sensation with baseline mild peripheral neuropathy. Negative pronator drift. .   SKIN: Warm, dry, intact.         Primary Care Physician   Nathaly Doyle    Discharge Orders      Physical Therapy  Referral      Reason for your hospital stay    You were hospitalized for further evaluation of transient blurred vision and garbled speech, suspect transient ischemic attack (TIA). You were cared for by the Hospitalist and Stroke Neurology teams, discharged with Plavix 75 mg daily X21 days in addition to your aspirin 81 mg daily with plans to resume aspirin alone after 21 days.     Activity    Your activity upon discharge: activity as tolerated     Discharge Instructions    1. Take Plavix 75 mg daily X21 days with aspirin 81 mg daily, after the 21 days, continue with aspirin alone as you were already taking   2. No change in additional prior to admission medications   3. Refill for pravastatin provided at discharge   4. Return to the ER with recurrent symptoms or any change in neurologic status that is concerning to you   5. Follow-up with your primary care provider in 1-2 weeks for hospital follow-up. Follow-up  with Stroke Neurology as recommended     Diet    Follow this diet upon discharge: Current Diet:Orders Placed This Encounter      Regular Diet Adult Thin Liquids (level 0)     Hospital Follow-up with Existing Primary Care Provider (PCP)    Please see details below            Significant Results and Procedures   Most Recent 3 CBC's:  Recent Labs   Lab Test 01/10/25  0939 01/09/25  1130 12/02/21  0516   WBC 5.3 6.1 6.9   HGB 12.7* 11.4* 10.7*   MCV 91 91 93    201 252     Most Recent 3 BMP's:  Recent Labs   Lab Test 01/10/25  1130 01/10/25  0939 01/10/25  0806 01/09/25  1130 12/02/21  0516   NA  --  139  --  136 137   POTASSIUM  --  4.5  --  4.4 3.9   CHLORIDE  --  103  --  103 106   CO2  --  24  --  26 26   BUN  --  21.0  --  24.8* 20   CR  --  1.22*  --  1.36* 1.13   ANIONGAP  --  12  --  7 5   KEYONNA  --  9.7  --  9.4 8.5   * 105* 94 109* 125*     Most Recent 2 LFT's:  Recent Labs   Lab Test 01/09/25  1130 12/01/21  2202   AST 16 12   ALT 13 13   ALKPHOS 69 59   BILITOTAL 0.6 0.5     Most Recent 3 Troponin's:No lab results found.  Most Recent Cholesterol Panel:  Recent Labs   Lab Test 01/09/25  1130   CHOL 149   LDL 75   HDL 55   TRIG 94     Most Recent Hemoglobin A1c:  Recent Labs   Lab Test 01/09/25  1130   A1C 6.2*   ,   Results for orders placed or performed during the hospital encounter of 01/09/25   MR Brain w/o & w Contrast    Narrative    EXAM: MR BRAIN W/O and W CONTRAST, MRA NECK (CAROTIDS) W/O and W CONTRAST, MRA BRAIN ([place] OF [place]) W/O CONTRAST  LOCATION: [facility]  DATE: 1/9/2025    INDICATION: aphasia, blurry vision, resolved  COMPARISON: None.  CONTRAST: 10 mL Gadavist  TECHNIQUE:   1) Routine multiplanar multisequence head MRI without and with intravenous contrast.  2) 3D time-of-flight head MRA without intravenous contrast.  3) Neck MRA without and with IV contrast. Stenosis measurements made according to NASCET criteria unless otherwise  specified.    FINDINGS:  HEAD MRI:  INTRACRANIAL CONTENTS: No acute or subacute infarct. No mass, acute hemorrhage, or extra-axial fluid collections. Patchy nonspecific T2/FLAIR hyperintensities within the cerebral white matter and sae most consistent with mild to moderate chronic   microvascular ischemic change. Mild to moderate generalized cerebral atrophy. No hydrocephalus. Normal position of the cerebellar tonsils. No pathologic contrast enhancement.    SELLA: No abnormality accounting for technique.    OSSEOUS STRUCTURES/SOFT TISSUES: Normal marrow signal. The major intracranial vascular flow voids are maintained.     ORBITS: Prior right cataract surgery. Visualized portions of the orbits are otherwise unremarkable.     SINUSES/MASTOIDS: No paranasal sinus mucosal disease. No middle ear or mastoid effusion.     HEAD MRA:   ANTERIOR CIRCULATION: No stenosis/occlusion, aneurysm, or high flow vascular malformation. Fetal origin of the right posterior cerebral artery from the anterior circulation.    POSTERIOR CIRCULATION: No stenosis/occlusion, aneurysm, or high flow vascular malformation. Dominant left and smaller right vertebral artery contribute to a normal basilar artery.     NECK MRA:   RIGHT CAROTID: No measurable stenosis or dissection.    LEFT CAROTID: No measurable stenosis or dissection.    VERTEBRAL ARTERIES: No focal stenosis or dissection. Dominant left and smaller right vertebral arteries.    AORTIC ARCH: Classic aortic arch anatomy with no significant stenosis at the origin of the great vessels.      Impression    IMPRESSION:  HEAD MRI:   1.  No acute intracranial process.  2.  Generalized brain atrophy and presumed microvascular ischemic changes as detailed above.    HEAD MRA:   1.  No aneurysm, high flow AVM or significant stenosis identified.  2.  Variant [place] of [place] anatomy as above.    NECK MRA:  1.  Normal neck MRA.   MRA Angiogram Head w/o Contrast    Narrative    EXAM: MR BRAIN W/O and  W CONTRAST, MRA NECK (CAROTIDS) W/O and W CONTRAST, MRA BRAIN ([place] OF MANN) W/O CONTRAST  LOCATION: [facility]  DATE: 1/9/2025    INDICATION: aphasia, blurry vision, resolved  COMPARISON: None.  CONTRAST: 10 mL Gadavist  TECHNIQUE:   1) Routine multiplanar multisequence head MRI without and with intravenous contrast.  2) 3D time-of-flight head MRA without intravenous contrast.  3) Neck MRA without and with IV contrast. Stenosis measurements made according to NASCET criteria unless otherwise specified.    FINDINGS:  HEAD MRI:  INTRACRANIAL CONTENTS: No acute or subacute infarct. No mass, acute hemorrhage, or extra-axial fluid collections. Patchy nonspecific T2/FLAIR hyperintensities within the cerebral white matter and sae most consistent with mild to moderate chronic   microvascular ischemic change. Mild to moderate generalized cerebral atrophy. No hydrocephalus. Normal position of the cerebellar tonsils. No pathologic contrast enhancement.    SELLA: No abnormality accounting for technique.    OSSEOUS STRUCTURES/SOFT TISSUES: Normal marrow signal. The major intracranial vascular flow voids are maintained.     ORBITS: Prior right cataract surgery. Visualized portions of the orbits are otherwise unremarkable.     SINUSES/MASTOIDS: No paranasal sinus mucosal disease. No middle ear or mastoid effusion.     HEAD MRA:   ANTERIOR CIRCULATION: No stenosis/occlusion, aneurysm, or high flow vascular malformation. Fetal origin of the right posterior cerebral artery from the anterior circulation.    POSTERIOR CIRCULATION: No stenosis/occlusion, aneurysm, or high flow vascular malformation. Dominant left and smaller right vertebral artery contribute to a normal basilar artery.     NECK MRA:   RIGHT CAROTID: No measurable stenosis or dissection.    LEFT CAROTID: No measurable stenosis or dissection.    VERTEBRAL ARTERIES: No focal stenosis or dissection. Dominant left and smaller right vertebral  arteries.    AORTIC ARCH: Classic aortic arch anatomy with no significant stenosis at the origin of the great vessels.      Impression    IMPRESSION:  HEAD MRI:   1.  No acute intracranial process.  2.  Generalized brain atrophy and presumed microvascular ischemic changes as detailed above.    HEAD MRA:   1.  No aneurysm, high flow AVM or significant stenosis identified.  2.  Variant [place] of [place] anatomy as above.    NECK MRA:  1.  Normal neck MRA.   MRA Angiogram Neck w/o & w Contrast    Narrative    EXAM: MR BRAIN W/O and W CONTRAST, MRA NECK (CAROTIDS) W/O and W CONTRAST, MRA BRAIN ([place] OF [place]) W/O CONTRAST  LOCATION: [facility]  DATE: 1/9/2025    INDICATION: aphasia, blurry vision, resolved  COMPARISON: None.  CONTRAST: 10 mL Gadavist  TECHNIQUE:   1) Routine multiplanar multisequence head MRI without and with intravenous contrast.  2) 3D time-of-flight head MRA without intravenous contrast.  3) Neck MRA without and with IV contrast. Stenosis measurements made according to NASCET criteria unless otherwise specified.    FINDINGS:  HEAD MRI:  INTRACRANIAL CONTENTS: No acute or subacute infarct. No mass, acute hemorrhage, or extra-axial fluid collections. Patchy nonspecific T2/FLAIR hyperintensities within the cerebral white matter and sae most consistent with mild to moderate chronic   microvascular ischemic change. Mild to moderate generalized cerebral atrophy. No hydrocephalus. Normal position of the cerebellar tonsils. No pathologic contrast enhancement.    SELLA: No abnormality accounting for technique.    OSSEOUS STRUCTURES/SOFT TISSUES: Normal marrow signal. The major intracranial vascular flow voids are maintained.     ORBITS: Prior right cataract surgery. Visualized portions of the orbits are otherwise unremarkable.     SINUSES/MASTOIDS: No paranasal sinus mucosal disease. No middle ear or mastoid effusion.     HEAD MRA:   ANTERIOR CIRCULATION: No stenosis/occlusion,  aneurysm, or high flow vascular malformation. Fetal origin of the right posterior cerebral artery from the anterior circulation.    POSTERIOR CIRCULATION: No stenosis/occlusion, aneurysm, or high flow vascular malformation. Dominant left and smaller right vertebral artery contribute to a normal basilar artery.     NECK MRA:   RIGHT CAROTID: No measurable stenosis or dissection.    LEFT CAROTID: No measurable stenosis or dissection.    VERTEBRAL ARTERIES: No focal stenosis or dissection. Dominant left and smaller right vertebral arteries.    AORTIC ARCH: Classic aortic arch anatomy with no significant stenosis at the origin of the great vessels.      Impression    IMPRESSION:  HEAD MRI:   1.  No acute intracranial process.  2.  Generalized brain atrophy and presumed microvascular ischemic changes as detailed above.    HEAD MRA:   1.  No aneurysm, high flow AVM or significant stenosis identified.  2.  Variant [place] of [place] anatomy as above.    NECK MRA:  1.  Normal neck MRA.   Echocardiogram Complete - For age > 60 yrs     Value    LVEF  55-60%    Narrative    [id number]  [id number]  [id number]  [id number]^YENI^DELIA^ASYA     [facility]  Echocardiography Laboratory  [address] [place] [address]     Name: MARIBEL SENA  MRN: [id number]  : 1932  Study Date: 01/10/2025 10:25 AM  Age: 89 yrs  Gender: Male  Patient Location: [facility]  Reason For Study: Cerebrovascular Incident  Ordering Physician: DELIA JAIME  Referring Physician: Nathaly Sanders  Performed By: Pancho Luna     BSA: 1.9 m2  Height: 69 in  Weight: 161 lb  HR: 72  BP: 150/75 mmHg  ______________________________________________________________________________  Procedure  Echocardiogram with two-dimensional, color and spectral Doppler. Definity (NDC  #[id number]) given intravenously.  ______________________________________________________________________________  Interpretation Summary     Left ventricular  systolic function is normal.The visual ejection fraction is  55-60%.  The right ventricular systolic function is normal.  Status post 29-mm Casarez [place] 3 aortic bioprosthetic valveThe gradient is  normal for this prosthetic aortic valve.No aortic regurgitation is present.  IVC diameter <2.1 cm collapsing >50% with sniff suggests a normal RA pressure  of 3 mmHg.  ______________________________________________________________________________  Left Ventricle  The left ventricle is normal in size. There is mild concentric left  ventricular hypertrophy. Diastolic Doppler findings (E/E' ratio and/or other  parameters) suggest left ventricular filling pressures are indeterminate. Left  ventricular systolic function is normal. The visual ejection fraction is 55-  60%. No regional wall motion abnormalities noted.     Right Ventricle  The right ventricle is normal size. The right ventricular systolic function is  normal.     Atria  The left atrium is mildly dilated. Right atrial size is normal. There is no  color Doppler evidence of an atrial shunt.     Mitral Valve  There is mild mitral annular calcification. There is trace mitral  regurgitation.     Tricuspid Valve  There is mild (1+) tricuspid regurgitation. The right ventricular systolic  pressure is approximated at 39.0 mmHg plus the right atrial pressure.  Pulmonary hypertension.     Aortic Valve  No aortic regurgitation is present. The mean AoV pressure gradient is 7.0  mmHg. There is a bioprosthetic aortic valve. Status post 29-mm Casarez Elana  3 aortic bioprosthetic valve. The prosthetic aortic valve is not well  visualized. The gradient is normal for this prosthetic aortic valve.     Pulmonic Valve  There is trace pulmonic valvular regurgitation. There is no pulmonic valvular  stenosis.     Vessels  The aortic root is normal size. Normal size ascending aorta. IVC diameter <2.1  cm collapsing >50% with sniff suggests a normal RA pressure of 3 mmHg.      Pericardium  There is no pericardial effusion.     Rhythm  Sinus rhythm was noted.  ______________________________________________________________________________  MMode/2D Measurements & Calculations  IVSd: 1.3 cm     LVIDd: 4.0 cm  LVIDs: 2.0 cm  LVPWd: 1.3 cm  FS: 49.5 %  LV mass(C)d: 183.4 grams  LV mass(C)dI: 97.4 grams/m2  Ao root diam: 2.8 cm  asc Aorta Diam: 3.3 cm  LVOT diam: 2.2 cm  LVOT area: 3.8 cm2  Ao root diam index Ht(cm/m): 1.6  Ao root diam index BSA (cm/m2): 1.5  Asc Ao diam index BSA (cm/m2): 1.7  Asc Ao diam index Ht(cm/m): 1.9  LA Volume (BP): 60.4 ml     LA Volume Index (BP): 32.1 ml/m2  RV Base: 3.2 cm  RWT: 0.63  TAPSE: 2.5 cm     Doppler Measurements & Calculations  MV E max willian: 78.8 cm/sec  MV A max willian: 130.0 cm/sec  MV E/A: 0.61  MV dec time: 0.33 sec  Ao V2 max: 177.0 cm/sec  Ao max P.0 mmHg  Ao V2 mean: 120.0 cm/sec  Ao mean P.0 mmHg  Ao V2 VTI: 39.2 cm  JORDYN(I,D): 1.3 cm2  JORDYN(V,D): 1.3 cm2  LV V1 max P.5 mmHg  LV V1 max: 62.1 cm/sec  LV V1 VTI: 13.0 cm  SV(LVOT): 49.9 ml  SI(LVOT): 26.5 ml/m2  PA acc time: 0.12 sec  TR max willian: 312.4 cm/sec  TR max P.0 mmHg  AV Willian Ratio (DI): 0.35  JORDYN Index (cm2/m2): 0.68     E/E' av.3  Lateral E/e': 10.3  Medial E/e': 14.2  RV S Willian: 13.6 cm/sec     ______________________________________________________________________________  Report approved by: Omar Vicente MD on 01/10/2025 03:31 PM             Discharge Medications   Current Discharge Medication List        START taking these medications    Details   clopidogrel (PLAVIX) 75 MG tablet Take 1 tablet (75 mg) by mouth daily.  Qty: 21 tablet, Refills: 0    Associated Diagnoses: TIA (transient ischemic attack)           CONTINUE these medications which have CHANGED    Details   pravastatin (PRAVACHOL) 20 MG tablet Take 1 tablet (20 mg) by mouth at bedtime.  Qty: 30 tablet, Refills: 0    Comments: Future refills by PCP Dr. Nathaly Doyle with phone number  [phone].  Associated Diagnoses: TIA (transient ischemic attack)           CONTINUE these medications which have NOT CHANGED    Details   amLODIPine (NORVASC) 10 MG tablet Take 10 mg by mouth daily.      aspirin 81 MG EC tablet Take 81 mg by mouth daily.      clobetasol propionate (TEMOVATE) 0.05 % external cream Apply topically 2 times daily as needed (dermatitis).      lisinopril (ZESTRIL) 30 MG tablet Take 30 mg by mouth at bedtime.      metoprolol succinate ER (TOPROL-XL) 25 MG 24 hr tablet Take 25 mg by mouth 2 times daily      COMPRESSION STOCKINGS 1 each continuous  Qty: 1 each, Refills: 0    Associated Diagnoses: Status post total right knee replacement           Allergies   No Known Allergies

## 2025-01-10 NOTE — PHARMACY-ADMISSION MEDICATION HISTORY
Pharmacist Admission Medication History    Admission medication history is complete. The information provided in this note is only as accurate as the sources available at the time of the update.    Information Source(s): Patient and CareEverywhere/SureScripts via in-person    Pertinent Information: Current fills for Rx meds not found in sure scripts, but the patient states that he is still taking these meds    Changes made to PTA medication list:  Added: clobetasol  Deleted: miralax, senna s, plavix  Changed: lisinopril dose    Allergies reviewed with patient and updates made in EHR: yes    Medication History Completed By: Vasquez Patel RPH 1/9/2025 6:27 PM    PTA Med List   Medication Sig Last Dose/Taking    amLODIPine (NORVASC) 10 MG tablet Take 10 mg by mouth daily. 1/8/2025    aspirin 81 MG EC tablet Take 81 mg by mouth daily. 1/8/2025    clobetasol propionate (TEMOVATE) 0.05 % external cream Apply topically 2 times daily as needed (dermatitis). Unknown    lisinopril (ZESTRIL) 30 MG tablet Take 30 mg by mouth at bedtime. 1/8/2025 Evening    metoprolol succinate ER (TOPROL-XL) 25 MG 24 hr tablet Take 25 mg by mouth 2 times daily 1/8/2025 Evening    pravastatin (PRAVACHOL) 20 MG tablet Take 20 mg by mouth at bedtime. 1/8/2025 Evening

## 2025-01-10 NOTE — CONSULTS
Care Management Initial Consult    General Information  Assessment completed with: Nima Erwin  Type of CM/SW Visit: Initial Assessment    Primary Care Provider verified and updated as needed: Yes   Readmission within the last 30 days: no previous admission in last 30 days      Reason for Consult: discharge planning  Advance Care Planning: Advance Care Planning Reviewed: no concerns identified          Communication Assessment  Patient's communication style: spoken language (English or Bilingual)             Cognitive  Cognitive/Neuro/Behavioral: WDL                      Living Environment:   People in home: alone     Current living Arrangements: house      Able to return to prior arrangements: yes       Family/Social Support:  Care provided by: self  Provides care for: no one  Marital Status:   Support system: Children          Description of Support System: Supportive, Involved         Current Resources:   Patient receiving home care services: No        Community Resources:    Equipment currently used at home: none, grab bar, toilet, grab bar, tub/shower  Supplies currently used at home: None    Employment/Financial:  Employment Status: retired        Financial Concerns: none   Referral to Financial Worker: No       Does the patient's insurance plan have a 3 day qualifying hospital stay waiver?  No    Lifestyle & Psychosocial Needs:  Social Drivers of Health     Food Insecurity: No Food Insecurity (5/13/2024)    Received from [facility]    Food Insecurity     Do you worry your food will run out before you are able to buy more?: 1   Depression: Not at risk (6/13/2024)    Received from [facility]    PHQ-2     PHQ-2 TOTAL SCORE: 0   Housing Stability: Low Risk  (5/13/2024)    Received from [facility]    Housing Stability     What is your housing situation today?: 1   Tobacco Use: Medium Risk (6/13/2024)     Received from [facility]    Patient History     Smoking Tobacco Use: Former     Smokeless Tobacco Use: Never     Passive Exposure: Not on file   Financial Resource Strain: Low Risk  (5/13/2024)    Received from [facility]    Financial Resource Strain     Difficulty of Paying Living Expenses: 3     Difficulty of Paying Living Expenses: Not on file   Alcohol Use: Not on file   Transportation Needs: No Transportation Needs (5/13/2024)    Received from [facility]    Transportation Needs     Does lack of transportation keep you from medical appointments?: 1     Does lack of transportation keep you from work, meetings or getting things that you need?: 1   Physical Activity: Not on file   Interpersonal Safety: Not on file   Stress: Not on file   Social Connections: Socially Integrated (5/13/2024)    Received from [facility]    Social Connections     Do you often feel lonely or isolated from those around you?: 0   Health Literacy: Not on file       Functional Status:  Prior to admission patient needed assistance:   Dependent ADLs:: Independent  Dependent IADLs:: Independent       Mental Health Status:  Mental Health Status: No Current Concerns       Chemical Dependency Status:  Chemical Dependency Status: No Current Concerns             Values/Beliefs:  Spiritual, Cultural Beliefs, [employer] Practices, Values that affect care: no               Discussed  Partnership in Safe Discharge Planning  document with patient/family: Yes: Nima    Additional Information:  Writer met with patient, introduced self and role in discharge planning. Nima verified his address, phone, insurance and PCP. Nima lived independently in a house in [place], he does have stairs to the basement, but there are railings. At baseline he is completely independent, and has been seen by therapies who feel he is back to his  "baseline. He says his daughter is \"on her way here to get me\", and identifies no needs from our team. No further care management intervention anticipated at this time.  Please re-consult if further needs arise.  Care management signing off.        Next Steps: Home no needs    Shruthi Key, RN Care Coordinator  [facility]  [phone]      "

## 2025-01-10 NOTE — ED NOTES
"[facility]  ED Nurse Handoff Report    ED Chief complaint: Altered Mental Status      ED Diagnosis:   Final diagnoses:   TIA (transient ischemic attack)       Code Status:  Per the admitting provider     Allergies: No Known Allergies    Patient Story:  Pt  reports experiencing  word finding difficulties, headache, and blurry vision, onset 0815  this morning.  Pt back at  Back to baseline   without any deficits. Noted. Hx: CAD, HTN, Aortic stenosis.     Focused Assessment:   Pt A/O x 4, VSS denied pain or blurred vision. Neuro's intact, speaking in complete sentence. Pt passed dysphagia Screen and was able to  swallow schedule dose of Plavix as ordered. Pt is currently communicating with his daughter who is at the bedside. Pt declined to change in hospital and \" Prefer to still in my clothes until I go to my room\" Report pt.     Treatments and/or interventions provided:  lab, EKG, MRI, MRA, Plavix, ASA  Results for orders placed or performed during the hospital encounter of 01/09/25   MR Brain w/o & w Contrast     Status: None    Narrative    EXAM: MR BRAIN W/O and W CONTRAST, MRA NECK (CAROTIDS) W/O and W CONTRAST, MRA BRAIN ([place]) W/O CONTRAST  LOCATION: [facility]  DATE: 1/9/2025    INDICATION: aphasia, blurry vision, resolved  COMPARISON: None.  CONTRAST: 10 mL Gadavist  TECHNIQUE:   1) Routine multiplanar multisequence head MRI without and with intravenous contrast.  2) 3D time-of-flight head MRA without intravenous contrast.  3) Neck MRA without and with IV contrast. Stenosis measurements made according to NASCET criteria unless otherwise specified.    FINDINGS:  HEAD MRI:  INTRACRANIAL CONTENTS: No acute or subacute infarct. No mass, acute hemorrhage, or extra-axial fluid collections. Patchy nonspecific T2/FLAIR hyperintensities within the cerebral white matter and sae most consistent with mild to moderate chronic   microvascular ischemic change. Mild to " moderate generalized cerebral atrophy. No hydrocephalus. Normal position of the cerebellar tonsils. No pathologic contrast enhancement.    SELLA: No abnormality accounting for technique.    OSSEOUS STRUCTURES/SOFT TISSUES: Normal marrow signal. The major intracranial vascular flow voids are maintained.     ORBITS: Prior right cataract surgery. Visualized portions of the orbits are otherwise unremarkable.     SINUSES/MASTOIDS: No paranasal sinus mucosal disease. No middle ear or mastoid effusion.     HEAD MRA:   ANTERIOR CIRCULATION: No stenosis/occlusion, aneurysm, or high flow vascular malformation. Fetal origin of the right posterior cerebral artery from the anterior circulation.    POSTERIOR CIRCULATION: No stenosis/occlusion, aneurysm, or high flow vascular malformation. Dominant left and smaller right vertebral artery contribute to a normal basilar artery.     NECK MRA:   RIGHT CAROTID: No measurable stenosis or dissection.    LEFT CAROTID: No measurable stenosis or dissection.    VERTEBRAL ARTERIES: No focal stenosis or dissection. Dominant left and smaller right vertebral arteries.    AORTIC ARCH: Classic aortic arch anatomy with no significant stenosis at the origin of the great vessels.      Impression    IMPRESSION:  HEAD MRI:   1.  No acute intracranial process.  2.  Generalized brain atrophy and presumed microvascular ischemic changes as detailed above.    HEAD MRA:   1.  No aneurysm, high flow AVM or significant stenosis identified.  2.  Variant [place] anatomy as above.    NECK MRA:  1.  Normal neck MRA.   MRA Angiogram Head w/o Contrast     Status: None    Narrative    EXAM: MR BRAIN W/O and W CONTRAST, MRA NECK (CAROTIDS) W/O and W CONTRAST, MRA BRAIN ([place]) W/O CONTRAST  [facility]: [facility]  DATE: 1/9/2025    INDICATION: aphasia, blurry vision, resolved  COMPARISON: None.  CONTRAST: 10 mL Gadavist  TECHNIQUE:   1) Routine multiplanar multisequence head  MRI without and with intravenous contrast.  2) 3D time-of-flight head MRA without intravenous contrast.  3) Neck MRA without and with IV contrast. Stenosis measurements made according to NASCET criteria unless otherwise specified.    FINDINGS:  HEAD MRI:  INTRACRANIAL CONTENTS: No acute or subacute infarct. No mass, acute hemorrhage, or extra-axial fluid collections. Patchy nonspecific T2/FLAIR hyperintensities within the cerebral white matter and sae most consistent with mild to moderate chronic   microvascular ischemic change. Mild to moderate generalized cerebral atrophy. No hydrocephalus. Normal position of the cerebellar tonsils. No pathologic contrast enhancement.    SELLA: No abnormality accounting for technique.    OSSEOUS STRUCTURES/SOFT TISSUES: Normal marrow signal. The major intracranial vascular flow voids are maintained.     ORBITS: Prior right cataract surgery. Visualized portions of the orbits are otherwise unremarkable.     SINUSES/MASTOIDS: No paranasal sinus mucosal disease. No middle ear or mastoid effusion.     HEAD MRA:   ANTERIOR CIRCULATION: No stenosis/occlusion, aneurysm, or high flow vascular malformation. Fetal origin of the right posterior cerebral artery from the anterior circulation.    POSTERIOR CIRCULATION: No stenosis/occlusion, aneurysm, or high flow vascular malformation. Dominant left and smaller right vertebral artery contribute to a normal basilar artery.     NECK MRA:   RIGHT CAROTID: No measurable stenosis or dissection.    LEFT CAROTID: No measurable stenosis or dissection.    VERTEBRAL ARTERIES: No focal stenosis or dissection. Dominant left and smaller right vertebral arteries.    AORTIC ARCH: Classic aortic arch anatomy with no significant stenosis at the origin of the great vessels.      Impression    IMPRESSION:  HEAD MRI:   1.  No acute intracranial process.  2.  Generalized brain atrophy and presumed microvascular ischemic changes as detailed above.    HEAD MRA:   1.   No aneurysm, high flow AVM or significant stenosis identified.  2.  Variant [place] anatomy as above.    NECK MRA:  1.  Normal neck MRA.   MRA Angiogram Neck w/o & w Contrast     Status: None    Narrative    EXAM: MR BRAIN W/O and W CONTRAST, MRA NECK (CAROTIDS) W/O and W CONTRAST, MRA BRAIN ([place]) W/O CONTRAST  LOCATION: [facility]  DATE: 1/9/2025    INDICATION: aphasia, blurry vision, resolved  COMPARISON: None.  CONTRAST: 10 mL Gadavist  TECHNIQUE:   1) Routine multiplanar multisequence head MRI without and with intravenous contrast.  2) 3D time-of-flight head MRA without intravenous contrast.  3) Neck MRA without and with IV contrast. Stenosis measurements made according to NASCET criteria unless otherwise specified.    FINDINGS:  HEAD MRI:  INTRACRANIAL CONTENTS: No acute or subacute infarct. No mass, acute hemorrhage, or extra-axial fluid collections. Patchy nonspecific T2/FLAIR hyperintensities within the cerebral white matter and sae most consistent with mild to moderate chronic   microvascular ischemic change. Mild to moderate generalized cerebral atrophy. No hydrocephalus. Normal position of the cerebellar tonsils. No pathologic contrast enhancement.    SELLA: No abnormality accounting for technique.    OSSEOUS STRUCTURES/SOFT TISSUES: Normal marrow signal. The major intracranial vascular flow voids are maintained.     ORBITS: Prior right cataract surgery. Visualized portions of the orbits are otherwise unremarkable.     SINUSES/MASTOIDS: No paranasal sinus mucosal disease. No middle ear or mastoid effusion.     HEAD MRA:   ANTERIOR CIRCULATION: No stenosis/occlusion, aneurysm, or high flow vascular malformation. Fetal origin of the right posterior cerebral artery from the anterior circulation.    POSTERIOR CIRCULATION: No stenosis/occlusion, aneurysm, or high flow vascular malformation. Dominant left and smaller right vertebral artery contribute to a normal  basilar artery.     NECK MRA:   RIGHT CAROTID: No measurable stenosis or dissection.    LEFT CAROTID: No measurable stenosis or dissection.    VERTEBRAL ARTERIES: No focal stenosis or dissection. Dominant left and smaller right vertebral arteries.    AORTIC ARCH: Classic aortic arch anatomy with no significant stenosis at the origin of the great vessels.      Impression    IMPRESSION:  HEAD MRI:   1.  No acute intracranial process.  2.  Generalized brain atrophy and presumed microvascular ischemic changes as detailed above.    HEAD MRA:   1.  No aneurysm, high flow AVM or significant stenosis identified.  2.  Variant [place] anatomy as above.    NECK MRA:  1.  Normal neck MRA.   Comprehensive metabolic panel     Status: Abnormal   Result Value Ref Range    Sodium 136 135 - 145 mmol/L    Potassium 4.4 3.4 - 5.3 mmol/L    Carbon Dioxide (CO2) 26 22 - 29 mmol/L    Anion Gap 7 7 - 15 mmol/L    Urea Nitrogen 24.8 (H) 8.0 - 23.0 mg/dL    Creatinine 1.36 (H) 0.67 - 1.17 mg/dL    GFR Estimate 49 (L) >60 mL/min/1.73m2    Calcium 9.4 8.8 - 10.4 mg/dL    Chloride 103 98 - 107 mmol/L    Glucose 109 (H) 70 - 99 mg/dL    Alkaline Phosphatase 69 40 - 150 U/L    AST 16 0 - 45 U/L    ALT 13 0 - 70 U/L    Protein Total 6.8 6.4 - 8.3 g/dL    Albumin 4.1 3.5 - 5.2 g/dL    Bilirubin Total 0.6 <=1.2 mg/dL   [place] Draw     Status: None    Narrative    The following orders were created for panel order [place] Draw.  Procedure                               Abnormality         Status                     ---------                               -----------         ------                     Extra Blue Top Tube[[id number]]                              Final result                 Please view results for these tests on the individual orders.   CBC with platelets and differential     Status: Abnormal   Result Value Ref Range    WBC Count 6.1 4.0 - 11.0 10e3/uL    RBC Count 3.85 (L) 4.40 - 5.90 10e6/uL    Hemoglobin 11.4 (L) 13.3 - 17.7  g/dL    Hematocrit 34.9 (L) 40.0 - 53.0 %    MCV 91 78 - 100 fL    MCH 29.6 26.5 - 33.0 pg    MCHC 32.7 31.5 - 36.5 g/dL    RDW 13.2 10.0 - 15.0 %    Platelet Count 201 150 - 450 10e3/uL    % Neutrophils 69 %    % Lymphocytes 20 %    % Monocytes 8 %    % Eosinophils 3 %    % Basophils 1 %    % Immature Granulocytes 0 %    NRBCs per 100 WBC 0 <1 /100    Absolute Neutrophils 4.2 1.6 - 8.3 10e3/uL    Absolute Lymphocytes 1.2 0.8 - 5.3 10e3/uL    Absolute Monocytes 0.5 0.0 - 1.3 10e3/uL    Absolute Eosinophils 0.2 0.0 - 0.7 10e3/uL    Absolute Basophils 0.0 0.0 - 0.2 10e3/uL    Absolute Immature Granulocytes 0.0 <=0.4 10e3/uL    Absolute NRBCs 0.0 10e3/uL   Extra Blue Top Tube     Status: None   Result Value Ref Range    Hold Specimen [facility]    Troponin T, High Sensitivity     Status: Abnormal   Result Value Ref Range    Troponin T, High Sensitivity 28 (H) <=22 ng/L   INR     Status: Normal   Result Value Ref Range    INR 1.07 0.85 - 1.15   Partial thromboplastin time     Status: Normal   Result Value Ref Range    aPTT 30 22 - 38 Seconds   EKG 12-lead, tracing only     Status: None   Result Value Ref Range    Systolic Blood Pressure  mmHg    Diastolic Blood Pressure  mmHg    Ventricular Rate 70 BPM    Atrial Rate 70 BPM    KS Interval 244 ms    QRS Duration 116 ms     ms    QTc 434 ms    P Axis 52 degrees    R AXIS -22 degrees    T Axis 59 degrees    Interpretation ECG       Sinus rhythm with 1st degree A-V block  Minimal voltage criteria for LVH, may be normal variant ( [place] product )  Anterior infarct , age undetermined  Abnormal ECG  When compared with ECG of 01-Dec-2021 22:18,  Premature ventricular complexes are no longer Present  Confirmed by GENERATED REPORT, COMPUTER ([id number]),  Estefani Myrick ([id number]) on 1/9/2025 5:17:06 PM     CBC with platelets + differential     Status: Abnormal    Narrative    The following orders were created for panel order CBC with platelets + differential.  Procedure                                Abnormality         Status                     ---------                               -----------         ------                     CBC with platelets and d...[[id number]]  Abnormal            Final result                 Please view results for these tests on the individual orders.      Patient's response to treatments and/or interventions:  Stable     To be done/followed up on inpatient unit:  See order     Does this patient have any cognitive concerns?:  No     Activity level - Baseline/Home:  Independent  Activity Level - Current:   Independent    Patient's Preferred language: English   Needed?: No    Isolation: None  Infection: Not Applicable  Patient tested for COVID 19 prior to admission: YES  Bariatric?: No    Vital Signs:   Vitals:    01/09/25 1127 01/09/25 1739   BP: 180/82 190/87   Pulse: 64 79   Resp: 16 18   Temp: 98.1  F (36.7  C)    SpO2: 98% 99%         Was the PSS-3 completed:   Yes  What interventions are required if any?               Family Comments:  Daughter is at the bedside   OBS brochure/video discussed/provided to patient/family: Yes              Name of person given brochure if not patient: N/A               Relationship to patient: N/A     For the majority of the shift this patient's behavior was Green.   Behavioral interventions performed were N/a .    ED NURSE PHONE NUMBER: *[id number]

## 2025-01-10 NOTE — PHARMACY-CONSULT NOTE
Pharmacy Consult to evaluate for medication related stroke core measures    Mickey ARACELI Donald, 89 year old male admitted for vision loss on 1/9/2025.    Thrombolytic was not given because of Clinical contraindications    VTE Prophylaxis SCDs /PCDs placed on 1/9/25, as appropriate prior to end of hospital day 2.    Antithrombotic: aspirin and clopidogrel started on 1/9/25, as appropriate by end of hospital day 2. Continue antithrombotic therapy on discharge to meet quality measures, unless contraindicated.    Anticoagulation if history of A-fib/flutter: Patient does not have history of A-fib/flutter - anticoagulation not required for medication related stroke core measures.     LDL Cholesterol Calculated   Date Value Ref Range Status   01/09/2025 75 <100 mg/dL Final       Patient's home statin, Pravachol (pravastatin) restarted; continue statin on discharge to meet quality measures, unless contraindicated.     Recommendations: None at this time    Thank you for the consult.    Mayra Cheatham [facility] 1/10/2025 11:01 AM

## 2025-01-10 NOTE — PROGRESS NOTES
Acknowledged SLP consult.  Reviewed chart and consulted with patient.  Patient passed nursing swallow screen and was advanced to a regular diet with thin liquids, which he reports he tolerated well.      His speech is clear, precise, and he is able to express basic to moderate level language without hesitation today.  Improved from yesterday per documentation and patient opinion.     Will defer current IP SLP order, recommending discharge home without skilled SLP service need.

## 2025-01-10 NOTE — PROGRESS NOTES
[facility]                                                                                   OUTPATIENT PHYSICAL THERAPY    PLAN OF TREATMENT FOR OUTPATIENT REHABILITATION   Patient's Last Name, First Name, Mickey Meyers YOB: 1932   Provider's Name   Psychiatric   Medical Record No.  [id number]     Onset Date: 01/09/25 Start of Care Date: 01/10/25     Medical Diagnosis:  Garbled speech, AMS               PT Diagnosis:  Difficulty with functional mobility. Certification Dates:  From: 01/10/25  To: 01/10/25       See note for plan of treatment, functional goals, and certification details.    I CERTIFY THE NEED FOR THESE SERVICES FURNISHED UNDER        THIS PLAN OF TREATMENT AND WHILE UNDER MY CARE (Physician co-signature of this document indicates review and certification of the therapy plan).               01/10/25 0602   Appointment Info   Signing Clinician's Name / Credentials (PT) Jesica Girard DPT   Living Environment   People in Home alone   Current Living Arrangements house   Home Accessibility stairs to enter home;stairs within home   Number of Stairs, Main Entrance 2   Stair Railings, Main Entrance none   Number of Stairs, Within Home, Primary   (Flight to basement)   Stair Railings, Within Home, Primary railings on both sides of stairs   Transportation Anticipated car, drives self;family or friend will provide   Living Environment Comments Pt's daughter and son in law are able to check in on patient and provide assist as needed.   Self-Care   Usual Activity Tolerance good   Current Activity Tolerance moderate   Equipment Currently Used at Home none   Fall history within last six months no   General Information   Onset of Illness/Injury or Date of Surgery 01/09/25   Referring Physician Sameer Morgan MD   Patient/Family Therapy Goals Statement (PT) Return home. Pt is aware current rec for OP PT.   Pertinent  History of Current Problem (include personal factors and/or comorbidities that impact the POC) 89 y/o male admitted with transient aphasia/vision loss likely due to TIA. PMH including coronary artery disease status stent in 2000, hypertension, hyperlipidemia, CKD stage III, chronic anemia.   Existing Precautions/Restrictions fall   General Observations Pt sitting EOB upon arrival of therapist.   Cognition   Affect/Mental Status (Cognition) WFL   Orientation Status (Cognition) oriented x 3   Follows Commands (Cognition) WFL   Pain Assessment   Patient Currently in Pain No   Integumentary/Edema   Integumentary/Edema Comments Noted no swelling in B LEs.   Posture    Posture Comments Noted forward head and shoulder posture sitting EOB And standing at EOB.   Range of Motion (ROM)   ROM Comment B LEs WFL.   Strength (Manual Muscle Testing)   Strength Comments Not formally assessed, noted at least 3/5 grossly in B LEs with functional mobility. Noted no weakness difference between L/R.   Bed Mobility   Comment, (Bed Mobility) NT.   Transfers   Comment, (Transfers) Sit <> stand with no AD and SBA.   Gait/Stairs (Locomotion)   Comment, (Gait/Stairs) Pt amb 10' with no AD and SBA.   Balance   Balance Comments Noted good static balance, eyes open and eyes closed. Noted poor dynamic balance with tandem stance.   Sensory Examination   Sensory Perception Comments Pt denies numbness/tingling in B LEs.   Clinical Impression   Criteria for Skilled Therapeutic Intervention Yes, treatment indicated   PT Diagnosis (PT) Difficulty with functional mobility.   Influenced by the following impairments Impaired balance, Decreased activity tolerance   Functional limitations due to impairments Limited functional mobility requiring SBA.   Clinical Presentation (PT Evaluation Complexity) stable   Clinical Presentation Rationale Based on PMH, current presentation, and social support.   Clinical Decision Making (Complexity) low complexity   Planned  Therapy Interventions (PT) balance training;bed mobility training;gait training;stair training;strengthening;transfer training   Risk & Benefits of therapy have been explained patient   PT Total Evaluation Time   PT Eval, Low Complexity Minutes (52059) 10   Therapy Certification   Start of care date 01/10/25   Certification date from 01/10/25   Certification date to 01/10/25   Medical Diagnosis Garbled speech, AMS   Physical Therapy Goals   PT Frequency One time eval and treatment only   PT Predicted Duration/Target Date for Goal Attainment 01/10/25   PT Goals Bed Mobility;Transfers;Gait;Stairs   PT: Bed Mobility Modified independent;Supine to/from sit   PT: Transfers Supervision/stand-by assist;Sit to/from stand   PT: Gait Supervision/stand-by assist;100 feet   PT: Stairs Supervision/stand-by assist;4 stairs;Rail on left   Interventions   Interventions Quick Adds Gait Training;Therapeutic Activity   Therapeutic Activity   Therapeutic Activities: dynamic activities to improve functional performance Minutes (82671) 5   Symptoms Noted During/After Treatment Fatigue   Treatment Detail/Skilled Intervention PT: Pt performed sit <> stand from EOB And chair with no AD and SBA, at times pt utilized stepping strategy to gain balance upon standing, no overt LOB. Pt sitting up in chair at end of session, chair alarm on and needs within reach.   Gait Training   Gait Training Minutes (21501) 12   Symptoms Noted During/After Treatment (Gait Training) fatigue   Treatment Detail/Skilled Intervention PT: Gait training of 150' x 2 with no AD and SBA, noted occasional lateral path deviations, although no overt LOB. Pt navigated 4 stairs with B railings and 4 stairs with 1 railing, SBA, noted step-to gait pattern.   Distance in Feet 150' x 2   PT Discharge Planning   PT Plan Disch   PT Discharge Recommendation (DC Rec) home with assist;home with outpatient physical therapy   PT Rationale for DC Rec Pt is below baseline, limited by  impaired higher level balance and decreased activity tolerance. Pt will benefit from OP PT in order to continue to address higher level balance.   PT Brief overview of current status Goals of therapy will be to address safe mobility and make recs for d/c to next level of care. Pt and RN will continue to follow all falls risk precautions as documented by RN staff while hospitalized   PT Total Distance Amb During Session (feet) 300   Physical Therapy Time and Intention   Timed Code Treatment Minutes 17   Total Session Time (sum of timed and untimed services) 27   Physical Therapy Discharge Summary    Reason for therapy discharge:    Discharged to home with outpatient therapy.    Progress towards therapy goal(s). See goals on Care Plan in [facility] health record for goal details.  Goals partially met.  Barriers to achieving goals:   discharge from facility.    Therapy recommendation(s):    Continued therapy is recommended.  Rationale/Recommendations:  OP PT to address higher level balance.

## 2025-01-10 NOTE — PROGRESS NOTES
Echo tech notified of ordered Echo and observation status, states they will try and get to in the next couple of hours.

## 2025-01-13 ENCOUNTER — PATIENT OUTREACH (OUTPATIENT)
Dept: CARE COORDINATION | Facility: CLINIC | Age: OVER 89
End: 2025-01-13
Payer: COMMERCIAL

## 2025-01-13 NOTE — PROGRESS NOTES
Clinic Care Coordination Contact  Transitions of Care Outreach  Chief Complaint   Patient presents with    Clinic Care Coordination - Post Hospital       Most Recent Admission Date: 1/9/2025   Most Recent Admission Diagnosis: TIA (transient ischemic attack) - G45.9     Most Recent Discharge Date: 1/10/2025   Most Recent Discharge Diagnosis: TIA (transient ischemic attack) - G45.9     Transitions of Care Assessment    Discharge Assessment  How are you doing now that you are home?: Patient shares that he is doing well and feels great. Patient will see PCP and Cardiology this week. Writer let him know he should be hearing from Neurology to set up that appt. No questions/concerns or needs at this time.  How are your symptoms? (Red Flag symptoms escalate to triage hotline per guidelines): Improved  Do you know how to contact your clinic care team if you have future questions or changes to your health status? : Yes  Does the patient have their discharge instructions? : Yes  Does the patient have questions regarding their discharge instructions? : No  Were you started on any new medications or were there changes to any of your previous medications? : Yes  Does the patient have all of their medications?: Yes  Do you have questions regarding any of your medications? : No  Do you have all of your needed medical supplies or equipment (DME)?  (i.e. oxygen tank, CPAP, cane, etc.): Yes    Post-op ([facility] Only)  If the patient had a surgery or procedure, do they have any questions for a nurse?: No    Post-op (Clinicians Only)  Did the patient have surgery or a procedure: No        Follow up Plan     Discharge Follow-Up  Discharge follow up appointment scheduled in alignment with recommended follow up timeframe or Transitions of Risk Category? (Low = within 30 days; Moderate= within 14 days; High= within 7 days): Yes  Discharge Follow Up Appointment Date: 01/16/25  Discharge Follow Up Appointment Scheduled with?: Primary Care  Provider    No future appointments.    Outpatient Plan as outlined on AVS reviewed with patient.    For any urgent concerns, please contact our 24 hour nurse triage line: [phone] ([phone])       MARCO A Guillaume

## 2025-02-06 LAB — CV ZIO PRELIM RESULTS: NORMAL

## 2025-02-18 ENCOUNTER — OFFICE VISIT (OUTPATIENT)
Dept: NEUROLOGY | Facility: CLINIC | Age: OVER 89
End: 2025-02-18
Attending: PSYCHIATRY & NEUROLOGY
Payer: COMMERCIAL

## 2025-02-18 VITALS
HEART RATE: 57 BPM | OXYGEN SATURATION: 100 % | BODY MASS INDEX: 23.99 KG/M2 | HEIGHT: 69 IN | WEIGHT: 162 LBS | DIASTOLIC BLOOD PRESSURE: 77 MMHG | SYSTOLIC BLOOD PRESSURE: 184 MMHG

## 2025-02-18 DIAGNOSIS — G62.9 PERIPHERAL POLYNEUROPATHY: ICD-10-CM

## 2025-02-18 DIAGNOSIS — R20.2 PARESTHESIAS: ICD-10-CM

## 2025-02-18 DIAGNOSIS — G45.9 TIA (TRANSIENT ISCHEMIC ATTACK): Primary | ICD-10-CM

## 2025-02-18 NOTE — NURSING NOTE
"Mickey Donald is a 89 year old male who presents for:  Chief Complaint   Patient presents with    Tia (Transient Ischemic Attack)     TIA         Initial Vitals:  BP (!) 184/77   Pulse 57   Ht 1.753 m (5' 9\")   Wt 73.5 kg (162 lb)   SpO2 100%   BMI 23.92 kg/m   Estimated body mass index is 23.92 kg/m  as calculated from the following:    Height as of this encounter: 1.753 m (5' 9\").    Weight as of this encounter: 73.5 kg (162 lb).. Body surface area is 1.89 meters squared. BP completed using cuff size: regular    Stacey Rathai   "

## 2025-02-18 NOTE — LETTER
2/18/2025      Mickey Donald  [address] [place] [address]      Dear Colleague,    Thank you for referring your patient, Mickey Donald, to the [facility]. Please see a copy of my visit note below.    [facility] Neurology New Patient Visit    Mickey Donald MRN# [id number]   Age: 89 year old YOB: 1932     Requesting physician: Carl Jamison*  Nathaly Doyle     Reason for Consultation: TIA follow-up    Please note the following note has been dictated, in whole or in part, and may contain minor word substitution or transposition errors.  Please interpret with this in mind.     Assessment and Plan:   Assessment/Plan:  Mr. Donald is a 89 year old male with multiple vascular risk factors here for follow up after a hospitalization for suspected TIA following an episode of transient aphasia and blurred vision. Episode was recounted and appears consistent with a TIA. We discussed stroke prevention in detail including proper control of his vascular risk factors. Notably his BP was elevated today at 184/77, which pt attributes to stress. Advised close monitoring and follow up with his PCP regarding optimal BP management. We briefly reviewed his ZIO patch results and a copy was sent to his cardiologist. He is to follow up with them regarding discussion of these results. Stroke signs and symptoms were reviewed in detail and patient was encouraged to present promptly to the ED should he experience any. All questions were answered and patient will follow up in Neurology clinic as needed going forward.    Of note, patient mentions of concerns regarding neuropathy in his bilateral lower extremities which he attributes to age. Denies pain, but rather reports a feeling of discomfort. Routine lab work for evaluation of neuropathy was ordered and is to be completed at patient's convenience.     Patient seen and discussed with my supervising physician,   "Raquel Trent DO  PGY-1, Neurology Resident     History of Presenting Symptoms:   Mickey Donald is a 89 year old male with PMHx of Hypertension, Hyperlipidemia, CAD, CKD stage IIIa, prediabetes who presents today for TIA follow-up. Today, patient reports that back on January 10th he woke up and felt his normal self in the morning. He proceeded to read his bible and eat breakfast without any difficulty. He then received a call during which he found he was unable to speak. States \"my brain and mouth were not connected.\" No other associated symptoms including vision changes, weakness, sensory changes, or dizziness. This episode last for a few minutes and then resolved. Son-in-law was already planning to stop by and subsequently brought him in to the ED for further evaluation. MRI was completed and negative for any acute stroke. Lab work was also completed and documented below. Episode was thought to be secondary to a TIA. He then Completed 3 weeks of DAPT with aspirin 81mg daily and Plavix 75 mg daily now back on monotherapy with ASA 81 mg daily. Additionally Ziopatch completed with occasional PVC's and PAC's.     Mr. Donald reports no recurrence of sudden neurologic change since his hospitalization. He denies any episode of sudden weakness, sensory changes, visions changes, dizziness, or difficulties with speech. He feels quite well. Only concern is of lower extremity neuropathy ongoing for quite some time.  He is compliant with his medications and uses a pillbox for assistance.     Medications:  Actively taking: Lisinopril, amlodipine, metoprolol BID, aspirin, pravastatin. Occasionally misses doses (less than 1x per week).     Social history:  Lives independently. Receives community meals from local [employer]. Manages own finances, groceries, hygiene, and other IADL's. Does endorse memory concerns, but attributes it to aging. Has not gotten lost while driving. Lives in [place]. Brother lives in " "[place]. 10 Great grandchildren.    Not a current smoker or alcohol drinker.   40 pack year history - Quit 20+ years ago     Physical Exam:   Vitals: BP (!) 184/77   Pulse 57   Ht 1.753 m (5' 9\")   Wt 73.5 kg (162 lb)   SpO2 100%   BMI 23.92 kg/m     General: Seated comfortably in no acute distress. Well groomed, pleasant, engaged.  HEENT: Oropharynx pink and moist with no visible lesion.    Pulmonary: Breathing comfortably on room air, speaking in full sentences, no cough, no accessory muscle use.     Neurologic:  Mental Status: Spontaneously alert, attentive, and oriented. No deficits of memory or fund of knowledge apparent to conversation. Language use grossly normal: occasional word-finding difficulty, speech clear and fluent.  Cranial Nerves: Pupils directly and consensually reactive bilaterally. No anisocoria. Extraocular movements intact with normal smooth pursuit, no nystagmus.  Facial movements symmetric. Impaired hearing. Tongue movements and soft palate elevation intact, without fasciculations or deviation.  No dysarthria. Shoulder shrug strong.  Motor:    LE: Strength 5/5 and equal bilaterally in hip flexion, extension, and ab/adduction. 5/5 and equal bilaterally in plantar and dorsiflexion of the foot  UE: Strength 5/5 and equal bilaterally in arm flexion/extension, and wrist palmar/dorsiflexion.  Finger spreading strong and equal bilaterally.  Reflexes: 1+ at achillies, patella. 2+ at biceps, brachioradialis.    Sensory: Intact to light touch in b/l upper and lower extremities.   Movements: No tremors or other abnormal movements observed.  Finger-nose-finger without dysmetria. No bradykinesia.  Gait: Antalgic gait due to left knee. Otherwise normal, steady casual gait.  Fundoscopic: Deferred.     Data: Pertinent prior to visit     Ziopatch Result date 2/6/2025:  14-day recording.   Occasional PVCs (~2%) and single 9-beat VT run.  Occasional PACs (~2%) and 14 SVT runs up to 19 beats.  No symptoms " reported.    Imaging:    Echocardiogram completed on 1/10/2025:  Interpretation Summary   Left ventricular systolic function is normal.The visual ejection fraction is  55-60%.  The right ventricular systolic function is normal.  Status post 29-mm [place] Elana 3 aortic bioprosthetic valveThe gradient is  normal for this prosthetic aortic valve.No aortic regurgitation is present.  IVC diameter <2.1 cm collapsing >50% with sniff suggests a normal RA pressure  of 3 mmHg.    MR Brain and MRA Head + Neck:  IMPRESSION:  HEAD MRI:   1.  No acute intracranial process.  2.  Generalized brain atrophy and presumed microvascular ischemic changes as detailed above.     HEAD MRA:   1.  No aneurysm, high flow AVM or significant stenosis identified.  2.  Variant [place] anatomy as above.     NECK MRA:  1.  Normal neck MRA.    Laboratory:  LDL 75 on 1/9/2025  A1c of 6.2% on 1/9/2025   Creatinine of 1.36 on 1/9/2025     Past Medical History:     Patient Active Problem List   Diagnosis     Coronary artery disease     Hypertension     Osteoarthritis     Degenerative arthritis of knee     Acute appendicitis with localized peritonitis, without perforation, abscess, or gangrene     TIA (transient ischemic attack)     Past Medical History:   Diagnosis Date     Coronary artery disease      Hypertension      Osteoarthritis     knees     Stented coronary artery     2000        Past Surgical History:     Past Surgical History:   Procedure Laterality Date     APPENDECTOMY OPEN N/A 12/2/2021    Procedure: APPENDECTOMY, OPEN;  Surgeon: Libertad Kyle MD;  Location: [facility] OR     ARTHROPLASTY KNEE Right 6/27/2017    Procedure: ARTHROPLASTY KNEE;  Right total knee arthroplasty;  Surgeon: Saqib Seaman MD;  Location: [facility] OR     CARDIAC SURGERY      stents cardiac 2000     EYE SURGERY      cataract surg right eye     HERNIORRHAPHY INGUINAL  5/31/2013    Procedure: HERNIORRHAPHY INGUINAL;  Right Inguinal Hernia Repair with Mesh;  Surgeon:  Young Kemp MD;  Location:  OR        Social History:     Social History     Tobacco Use     Smoking status: Former     Current packs/day: 0.00     Types: Cigarettes     Quit date: 1968     Years since quittin.1     Tobacco comments:     quit    Substance Use Topics     Alcohol use: No     Drug use: No        Family History:   No family history on file.     Medications:     Current Outpatient Medications   Medication Sig Dispense Refill     amLODIPine (NORVASC) 10 MG tablet Take 10 mg by mouth daily.       aspirin 81 MG EC tablet Take 81 mg by mouth daily.       clobetasol propionate (TEMOVATE) 0.05 % external cream Apply topically 2 times daily as needed (dermatitis).       clopidogrel (PLAVIX) 75 MG tablet Take 1 tablet (75 mg) by mouth daily. 21 tablet 0     lisinopril (ZESTRIL) 30 MG tablet Take 30 mg by mouth at bedtime.       metoprolol succinate ER (TOPROL-XL) 25 MG 24 hr tablet Take 25 mg by mouth 2 times daily       pravastatin (PRAVACHOL) 20 MG tablet Take 1 tablet (20 mg) by mouth at bedtime. 30 tablet 0     COMPRESSION STOCKINGS 1 each continuous (Patient not taking: Reported on 2025) 1 each 0     No current facility-administered medications for this visit.        Allergies:     Allergies   Allergen Reactions     Seasonal Allergies      Pollen         Review of Systems:   A focused review of systems was performed and found to be negative except as described in this note.            Attestation signed by Nilam García MD at 2025  3:51 PM:  Neurology Attending Attestation:   I personally saw this patient with our neurology resident and agree with the resident's findings and plan of care as documented in the resident's note. I personally performed salient aspects of the history and neurological examination. I personally reviewed the vital signs, medications, and labs. I personally viewed the imaging, and agree with the interpretation documented by the resident.     Mickey CHARLES  Odilon is a 89 year old man who presents for follow-up of transient spell, description is concerning for TIA.  His spell was described as transient difficulty with speech, records report visual changes well but the patient denies this today in clinic.  This only lasted for a few minutes, was resolved by the time he got to the emergency department.  He has never had similar spells in the past and none since.  He has had 1 other spell of acute neurologic change, which he described as getting up out of bed walking FDC across the room and then feeling lightheaded and dizzy, having to lean on a dresser and rest his head down, resolved after a minute or so.  No loss of consciousness or speech disturbance at that time as far as he is aware, although he does live alone.  TIA workup was completed and largely unremarkable.  Notably his blood pressure today is quite high at 184/77 and I recommended that he follow-up with his PCP after maintaining a blood pressure diary to ensure that this is an exception and not the norm.  He has completed 3 weeks of dual antiplatelet and we encouraged him to continue on aspirin and pravastatin.  I also recommended that he follow-up with cardiology, with whom he is already established, regarding the runs of SVTs on his Zio patch.  No evidence of atrial fibrillation.    He also mentioned that he was having some numbness/discomfort in his bilateral lower extremities, mostly in the feet, and so we placed an order for some labs for reversible causes of neuropathy.  I offered physical therapy, but he feels like his balance is okay overall.    Nilam García MD    [facility]  Department of Neurology     50 minutes spent on the date of the encounter doing chart review, history and exam, documentation and further activities as noted above       Again, thank you for allowing me to participate in the care of your patient.        Sincerely,        Rico Trent,  DO    Electronically signed

## 2025-02-18 NOTE — PROGRESS NOTES
[facility] Neurology New Patient Visit    Mickey Donald MRN# [id number]   Age: 89 year old YOB: 1932     Requesting physician: Carl Jamison*  Nathaly Doyle     Reason for Consultation: TIA follow-up    Please note the following note has been dictated, in whole or in part, and may contain minor word substitution or transposition errors.  Please interpret with this in mind.     Assessment and Plan:   Assessment/Plan:  Mr. Donald is a 89 year old male with multiple vascular risk factors here for follow up after a hospitalization for suspected TIA following an episode of transient aphasia and blurred vision. Episode was recounted and appears consistent with a TIA. We discussed stroke prevention in detail including proper control of his vascular risk factors. Notably his BP was elevated today at 184/77, which pt attributes to stress. Advised close monitoring and follow up with his PCP regarding optimal BP management. We briefly reviewed his ZIO patch results and a copy was sent to his cardiologist. He is to follow up with them regarding discussion of these results. Stroke signs and symptoms were reviewed in detail and patient was encouraged to present promptly to the ED should he experience any. All questions were answered and patient will follow up in Neurology clinic as needed going forward.    Of note, patient mentions of concerns regarding neuropathy in his bilateral lower extremities which he attributes to age. Denies pain, but rather reports a feeling of discomfort. Routine lab work for evaluation of neuropathy was ordered and is to be completed at patient's convenience.     Patient seen and discussed with my supervising physician, Dr. Raquel Trent DO  PGY-1, Neurology Resident     History of Presenting Symptoms:   Mickey Donald is a 89 year old male with PMHx of Hypertension, Hyperlipidemia, CAD, CKD stage IIIa, prediabetes who presents today for TIA follow-up. Today,  "patient reports that back on January 10th he woke up and felt his normal self in the morning. He proceeded to read his bible and eat breakfast without any difficulty. He then received a call during which he found he was unable to speak. States \"my brain and mouth were not connected.\" No other associated symptoms including vision changes, weakness, sensory changes, or dizziness. This episode last for a few minutes and then resolved. Son-in-law was already planning to stop by and subsequently brought him in to the ED for further evaluation. MRI was completed and negative for any acute stroke. Lab work was also completed and documented below. Episode was thought to be secondary to a TIA. He then Completed 3 weeks of DAPT with aspirin 81mg daily and Plavix 75 mg daily now back on monotherapy with ASA 81 mg daily. Additionally Ziopatch completed with occasional PVC's and PAC's.     Mr. Donald reports no recurrence of sudden neurologic change since his hospitalization. He denies any episode of sudden weakness, sensory changes, visions changes, dizziness, or difficulties with speech. He feels quite well. Only concern is of lower extremity neuropathy ongoing for quite some time.  He is compliant with his medications and uses a pillbox for assistance.     Medications:  Actively taking: Lisinopril, amlodipine, metoprolol BID, aspirin, pravastatin. Occasionally misses doses (less than 1x per week).     Social history:  Lives independently. Receives community meals from [employer]. Manages own finances, groceries, hygiene, and other IADL's. Does endorse memory concerns, but attributes it to aging. Has not gotten lost while driving. Lives in [place]. Brother lives in [place]. 10 Great grandchildren.    Not a current smoker or alcohol drinker.   40 pack year history - Quit 20+ years ago     Physical Exam:   Vitals: BP (!) 184/77   Pulse 57   Ht 1.753 m (5' 9\")   Wt 73.5 kg (162 lb)   SpO2 100%   BMI 23.92 kg/m   "   General: Seated comfortably in no acute distress. Well groomed, pleasant, engaged.  HEENT: Oropharynx pink and moist with no visible lesion.    Pulmonary: Breathing comfortably on room air, speaking in full sentences, no cough, no accessory muscle use.     Neurologic:  Mental Status: Spontaneously alert, attentive, and oriented. No deficits of memory or fund of knowledge apparent to conversation. Language use grossly normal: occasional word-finding difficulty, speech clear and fluent.  Cranial Nerves: Pupils directly and consensually reactive bilaterally. No anisocoria. Extraocular movements intact with normal smooth pursuit, no nystagmus.  Facial movements symmetric. Impaired hearing. Tongue movements and soft palate elevation intact, without fasciculations or deviation.  No dysarthria. Shoulder shrug strong.  Motor:    LE: Strength 5/5 and equal bilaterally in hip flexion, extension, and ab/adduction. 5/5 and equal bilaterally in plantar and dorsiflexion of the foot  UE: Strength 5/5 and equal bilaterally in arm flexion/extension, and wrist palmar/dorsiflexion.  Finger spreading strong and equal bilaterally.  Reflexes: 1+ at achillies, patella. 2+ at biceps, brachioradialis.    Sensory: Intact to light touch in b/l upper and lower extremities.   Movements: No tremors or other abnormal movements observed.  Finger-nose-finger without dysmetria. No bradykinesia.  Gait: Antalgic gait due to left knee. Otherwise normal, steady casual gait.  Fundoscopic: Deferred.     Data: Pertinent prior to visit     Ziopatch Result date 2/6/2025:  14-day recording.   Occasional PVCs (~2%) and single 9-beat VT run.  Occasional PACs (~2%) and 14 SVT runs up to 19 beats.  No symptoms reported.    Imaging:    Echocardiogram completed on 1/10/2025:  Interpretation Summary   Left ventricular systolic function is normal.The visual ejection fraction is  55-60%.  The right ventricular systolic function is normal.  Status post 29-mm Casarez  Elana 3 aortic bioprosthetic valveThe gradient is  normal for this prosthetic aortic valve.No aortic regurgitation is present.  IVC diameter <2.1 cm collapsing >50% with sniff suggests a normal RA pressure  of 3 mmHg.    MR Brain and MRA Head + Neck:  IMPRESSION:  HEAD MRI:   1.  No acute intracranial process.  2.  Generalized brain atrophy and presumed microvascular ischemic changes as detailed above.     HEAD MRA:   1.  No aneurysm, high flow AVM or significant stenosis identified.  2.  Variant [place] anatomy as above.     NECK MRA:  1.  Normal neck MRA.    Laboratory:  LDL 75 on 2025  A1c of 6.2% on 2025   Creatinine of 1.36 on 2025     Past Medical History:     Patient Active Problem List   Diagnosis    Coronary artery disease    Hypertension    Osteoarthritis    Degenerative arthritis of knee    Acute appendicitis with localized peritonitis, without perforation, abscess, or gangrene    TIA (transient ischemic attack)     Past Medical History:   Diagnosis Date    Coronary artery disease     Hypertension     Osteoarthritis     knees    Stented coronary artery             Past Surgical History:     Past Surgical History:   Procedure Laterality Date    APPENDECTOMY OPEN N/A 2021    Procedure: APPENDECTOMY, OPEN;  Surgeon: Libertad Kyle MD;  Location: [facility] OR    ARTHROPLASTY KNEE Right 2017    Procedure: ARTHROPLASTY KNEE;  Right total knee arthroplasty;  Surgeon: Saqib Seaman MD;  Location: [facility] OR    CARDIAC SURGERY      stents cardiac     EYE SURGERY      cataract surg right eye    HERNIORRHAPHY INGUINAL  2013    Procedure: HERNIORRHAPHY INGUINAL;  Right Inguinal Hernia Repair with Mesh;  Surgeon: Young Kemp MD;  Location: [facility] OR        Social History:     Social History     Tobacco Use    Smoking status: Former     Current packs/day: 0.00     Types: Cigarettes     Quit date: 1968     Years since quittin.1    Tobacco comments:     quit     Substance Use Topics    Alcohol use: No    Drug use: No        Family History:   No family history on file.     Medications:     Current Outpatient Medications   Medication Sig Dispense Refill    amLODIPine (NORVASC) 10 MG tablet Take 10 mg by mouth daily.      aspirin 81 MG EC tablet Take 81 mg by mouth daily.      clobetasol propionate (TEMOVATE) 0.05 % external cream Apply topically 2 times daily as needed (dermatitis).      clopidogrel (PLAVIX) 75 MG tablet Take 1 tablet (75 mg) by mouth daily. 21 tablet 0    lisinopril (ZESTRIL) 30 MG tablet Take 30 mg by mouth at bedtime.      metoprolol succinate ER (TOPROL-XL) 25 MG 24 hr tablet Take 25 mg by mouth 2 times daily      pravastatin (PRAVACHOL) 20 MG tablet Take 1 tablet (20 mg) by mouth at bedtime. 30 tablet 0    COMPRESSION STOCKINGS 1 each continuous (Patient not taking: Reported on 2/18/2025) 1 each 0     No current facility-administered medications for this visit.        Allergies:     Allergies   Allergen Reactions    Seasonal Allergies      Pollen         Review of Systems:   A focused review of systems was performed and found to be negative except as described in this note.

## 2025-02-18 NOTE — PATIENT INSTRUCTIONS
Please make an appointment for lab work regarding the neuropathy in your feet. Additionally, follow up with your cardiologist regarding the ZIO Patch that you recently completed. It is also a good idea to follow with your PCP regarding your blood pressure.

## (undated) DEVICE — TUBING SUCTION 12"X1/4" N612

## (undated) DEVICE — LINEN HALF SHEET 5512

## (undated) DEVICE — NDL SPINAL 18GA 3.5" 405184

## (undated) DEVICE — SYR 30ML LL W/O NDL 302832

## (undated) DEVICE — STPL RELOAD LINEAR CUT 55MM TCR55

## (undated) DEVICE — SYR 10ML FINGER CONTROL W/O NDL 309695

## (undated) DEVICE — SU VICRYL 0 TIE 12X18" J906G

## (undated) DEVICE — LINEN TOWEL PACK X5 5464

## (undated) DEVICE — ESU GROUND PAD ADULT W/CORD E7507

## (undated) DEVICE — SUCTION MANIFOLD NEPTUNE 2 SYS 4 PORT 0702-020-000

## (undated) DEVICE — SOL WATER IRRIG 1000ML BOTTLE 2F7114

## (undated) DEVICE — LINEN FULL SHEET 5511

## (undated) DEVICE — SU VICRYL 0 CT-2 27" J334H

## (undated) DEVICE — CATH TRAY FOLEY COUDE SURESTEP 16FR W/DRN BAG LATEX A304416A

## (undated) DEVICE — NDL 18GA 1.5" 305196

## (undated) DEVICE — TOURNIQUET CUFF 30" REPRO BLUE 60-7070-105

## (undated) DEVICE — GLOVE PROTEXIS POWDER FREE 8.0 ORTHOPEDIC 2D73ET80

## (undated) DEVICE — BLADE SAW SAGITTAL STRK 25X100X1.27MM HD SYS 6 6125-127-100

## (undated) DEVICE — PACK MINOR CUSTOM RIDGES SBA32RMRMA

## (undated) DEVICE — DRSG AQUACEL AG 3.5X9.75" HYDROFIBER 412011

## (undated) DEVICE — BLADE CLIPPER 3M 9670

## (undated) DEVICE — PREP CHLORAPREP 26ML TINTED ORANGE  260815

## (undated) DEVICE — SOL NACL 0.9% IRRIG 1000ML BOTTLE 2F7124

## (undated) DEVICE — CAST PADDING 6" STERILE 9046S

## (undated) DEVICE — GLOVE PROTEXIS POWDER FREE 6.5 ORTHOPEDIC 2D73ET65

## (undated) DEVICE — SU VICRYL 1 CT-1 27" J341H

## (undated) DEVICE — STPL LINEAR CUT 55MM TLC55

## (undated) DEVICE — PACK TOTAL KNEE BOXED LATEX FREE PO15TKFCT

## (undated) DEVICE — LINEN TOWEL PACK X10 5473

## (undated) DEVICE — DRSG STERI STRIP 1/2X4" R1547

## (undated) DEVICE — NDL BLUNT 18GA 1" W/O FILTER 305181

## (undated) DEVICE — BAG CLEAR TRASH 1.3M 39X33" P4040C

## (undated) DEVICE — SU MONOCRYL 4-0 P-3 18" UND Y494G

## (undated) DEVICE — Device

## (undated) DEVICE — LINEN ORTHO ACL PACK 5447

## (undated) DEVICE — DRAPE LAP W/ARMBOARD 29410

## (undated) DEVICE — SYR 05ML LL W/O NDL

## (undated) DEVICE — SU VICRYL 2-0 CT-1 27" UND J259H

## (undated) DEVICE — PREP CHLORAPREP 26ML TINTED HI-LITE ORANGE 930815

## (undated) RX ORDER — PROPOFOL 10 MG/ML
INJECTION, EMULSION INTRAVENOUS
Status: DISPENSED
Start: 2017-06-27

## (undated) RX ORDER — FENTANYL CITRATE 50 UG/ML
INJECTION, SOLUTION INTRAMUSCULAR; INTRAVENOUS
Status: DISPENSED
Start: 2021-12-02

## (undated) RX ORDER — PHENYLEPHRINE HCL IN 0.9% NACL 1 MG/10 ML
SYRINGE (ML) INTRAVENOUS
Status: DISPENSED
Start: 2017-06-27

## (undated) RX ORDER — BUPIVACAINE HYDROCHLORIDE AND EPINEPHRINE 5; 5 MG/ML; UG/ML
INJECTION, SOLUTION EPIDURAL; INTRACAUDAL; PERINEURAL
Status: DISPENSED
Start: 2017-06-27

## (undated) RX ORDER — ONDANSETRON 2 MG/ML
INJECTION INTRAMUSCULAR; INTRAVENOUS
Status: DISPENSED
Start: 2017-06-27

## (undated) RX ORDER — HYDROCODONE BITARTRATE AND ACETAMINOPHEN 5; 325 MG/1; MG/1
TABLET ORAL
Status: DISPENSED
Start: 2021-12-02

## (undated) RX ORDER — DEXAMETHASONE SODIUM PHOSPHATE 10 MG/ML
INJECTION, SOLUTION INTRAMUSCULAR; INTRAVENOUS
Status: DISPENSED
Start: 2017-06-27

## (undated) RX ORDER — CEFAZOLIN SODIUM 2 G/100ML
INJECTION, SOLUTION INTRAVENOUS
Status: DISPENSED
Start: 2017-06-27

## (undated) RX ORDER — HYDRALAZINE HYDROCHLORIDE 20 MG/ML
INJECTION INTRAMUSCULAR; INTRAVENOUS
Status: DISPENSED
Start: 2021-12-02

## (undated) RX ORDER — CEFOTETAN DISODIUM 1 G/10ML
INJECTION, POWDER, FOR SOLUTION INTRAMUSCULAR; INTRAVENOUS
Status: DISPENSED
Start: 2021-12-02

## (undated) RX ORDER — BUPIVACAINE HYDROCHLORIDE 2.5 MG/ML
INJECTION, SOLUTION EPIDURAL; INFILTRATION; INTRACAUDAL
Status: DISPENSED
Start: 2021-12-02

## (undated) RX ORDER — METOPROLOL TARTRATE 1 MG/ML
INJECTION, SOLUTION INTRAVENOUS
Status: DISPENSED
Start: 2017-06-27